# Patient Record
Sex: FEMALE | Race: OTHER | Employment: OTHER
[De-identification: names, ages, dates, MRNs, and addresses within clinical notes are randomized per-mention and may not be internally consistent; named-entity substitution may affect disease eponyms.]

---

## 2017-01-13 ENCOUNTER — OFFICE VISIT (OUTPATIENT)
Dept: OBGYN | Facility: CLINIC | Age: 62
End: 2017-01-13

## 2017-01-13 VITALS
DIASTOLIC BLOOD PRESSURE: 81 MMHG | SYSTOLIC BLOOD PRESSURE: 116 MMHG | HEIGHT: 59 IN | HEART RATE: 74 BPM | WEIGHT: 137.2 LBS | BODY MASS INDEX: 27.66 KG/M2

## 2017-01-13 DIAGNOSIS — Z71.2 ENCOUNTER TO DISCUSS TEST RESULTS: Primary | ICD-10-CM

## 2017-01-13 PROCEDURE — 99213 OFFICE O/P EST LOW 20 MIN: CPT | Performed by: OBSTETRICS & GYNECOLOGY

## 2017-01-23 ENCOUNTER — TELEPHONE (OUTPATIENT)
Dept: ONCOLOGY | Facility: CLINIC | Age: 62
End: 2017-01-23

## 2017-01-23 ENCOUNTER — OFFICE VISIT (OUTPATIENT)
Dept: ONCOLOGY | Facility: CLINIC | Age: 62
End: 2017-01-23

## 2017-01-23 VITALS
SYSTOLIC BLOOD PRESSURE: 134 MMHG | RESPIRATION RATE: 16 BRPM | TEMPERATURE: 98.1 F | WEIGHT: 138.8 LBS | BODY MASS INDEX: 28.03 KG/M2 | DIASTOLIC BLOOD PRESSURE: 84 MMHG | HEART RATE: 73 BPM

## 2017-01-23 DIAGNOSIS — M85.80 OSTEOPENIA: Primary | ICD-10-CM

## 2017-01-23 DIAGNOSIS — D05.11 DCIS (DUCTAL CARCINOMA IN SITU), RIGHT: ICD-10-CM

## 2017-01-23 PROCEDURE — 99214 OFFICE O/P EST MOD 30 MIN: CPT | Performed by: INTERNAL MEDICINE

## 2017-01-23 RX ORDER — MULTIVIT WITH MINERALS/LUTEIN
1000 TABLET ORAL DAILY
COMMUNITY

## 2017-01-23 RX ORDER — ANASTROZOLE 1 MG/1
1 TABLET ORAL DAILY
Qty: 30 TABLET | Refills: 3 | Status: SHIPPED | OUTPATIENT
Start: 2017-01-23 | End: 2017-05-15 | Stop reason: SDUPTHER

## 2017-01-24 ENCOUNTER — TELEPHONE (OUTPATIENT)
Dept: ONCOLOGY | Facility: CLINIC | Age: 62
End: 2017-01-24

## 2017-03-01 ENCOUNTER — TELEPHONE (OUTPATIENT)
Dept: ONCOLOGY | Facility: CLINIC | Age: 62
End: 2017-03-01

## 2017-03-17 ENCOUNTER — OFFICE VISIT (OUTPATIENT)
Dept: INTERNAL MEDICINE | Age: 62
End: 2017-03-17
Payer: COMMERCIAL

## 2017-03-17 VITALS
DIASTOLIC BLOOD PRESSURE: 80 MMHG | HEIGHT: 59 IN | OXYGEN SATURATION: 100 % | BODY MASS INDEX: 28.1 KG/M2 | TEMPERATURE: 99.1 F | HEART RATE: 100 BPM | SYSTOLIC BLOOD PRESSURE: 125 MMHG | WEIGHT: 139.4 LBS | RESPIRATION RATE: 12 BRPM

## 2017-03-17 DIAGNOSIS — J02.9 SORE THROAT: Primary | ICD-10-CM

## 2017-03-17 LAB — S PYO AG THROAT QL: NORMAL

## 2017-03-17 PROCEDURE — 99213 OFFICE O/P EST LOW 20 MIN: CPT | Performed by: INTERNAL MEDICINE

## 2017-03-17 PROCEDURE — 87880 STREP A ASSAY W/OPTIC: CPT | Performed by: INTERNAL MEDICINE

## 2017-03-17 RX ORDER — AMOXICILLIN 250 MG/1
250 CAPSULE ORAL 3 TIMES DAILY
Qty: 21 CAPSULE | Refills: 0 | Status: SHIPPED | OUTPATIENT
Start: 2017-03-17 | End: 2017-05-03 | Stop reason: ALTCHOICE

## 2017-03-17 ASSESSMENT — PATIENT HEALTH QUESTIONNAIRE - PHQ9
1. LITTLE INTEREST OR PLEASURE IN DOING THINGS: 0
2. FEELING DOWN, DEPRESSED OR HOPELESS: 0
SUM OF ALL RESPONSES TO PHQ9 QUESTIONS 1 & 2: 0
SUM OF ALL RESPONSES TO PHQ QUESTIONS 1-9: 0

## 2017-04-07 ENCOUNTER — HOSPITAL ENCOUNTER (OUTPATIENT)
Age: 62
Discharge: HOME | End: 2017-04-07
Payer: COMMERCIAL

## 2017-04-07 ENCOUNTER — HOSPITAL ENCOUNTER (OUTPATIENT)
Dept: GENERAL RADIOLOGY | Age: 62
Discharge: HOME | End: 2017-04-07
Payer: COMMERCIAL

## 2017-04-07 DIAGNOSIS — R09.81 NASAL CONGESTION: ICD-10-CM

## 2017-04-07 DIAGNOSIS — R09.82 POST-NASAL DRIP: ICD-10-CM

## 2017-04-07 DIAGNOSIS — R09.82 POST-NASAL DRIP: Primary | ICD-10-CM

## 2017-04-07 PROCEDURE — 70210 X-RAY EXAM OF SINUSES: CPT

## 2017-04-17 ENCOUNTER — HOSPITAL ENCOUNTER (OUTPATIENT)
Dept: MAMMOGRAPHY | Age: 62
Discharge: HOME | End: 2017-04-17
Attending: INTERNAL MEDICINE | Admitting: INTERNAL MEDICINE
Payer: COMMERCIAL

## 2017-04-17 DIAGNOSIS — M85.80 OSTEOPENIA: ICD-10-CM

## 2017-04-17 PROCEDURE — 77080 DXA BONE DENSITY AXIAL: CPT

## 2017-05-02 ENCOUNTER — HOSPITAL ENCOUNTER (OUTPATIENT)
Facility: MEDICAL CENTER | Age: 62
End: 2017-05-02
Payer: COMMERCIAL

## 2017-05-03 ENCOUNTER — TELEPHONE (OUTPATIENT)
Dept: INFUSION THERAPY | Facility: MEDICAL CENTER | Age: 62
End: 2017-05-03

## 2017-05-03 ENCOUNTER — OFFICE VISIT (OUTPATIENT)
Dept: ONCOLOGY | Age: 62
End: 2017-05-03
Payer: COMMERCIAL

## 2017-05-03 ENCOUNTER — TELEPHONE (OUTPATIENT)
Dept: ONCOLOGY | Age: 62
End: 2017-05-03

## 2017-05-03 VITALS
SYSTOLIC BLOOD PRESSURE: 131 MMHG | DIASTOLIC BLOOD PRESSURE: 83 MMHG | TEMPERATURE: 98.4 F | WEIGHT: 140.7 LBS | RESPIRATION RATE: 18 BRPM | HEART RATE: 72 BPM | BODY MASS INDEX: 28.42 KG/M2

## 2017-05-03 DIAGNOSIS — M85.80 OSTEOPENIA: ICD-10-CM

## 2017-05-03 DIAGNOSIS — D05.11 DCIS (DUCTAL CARCINOMA IN SITU), RIGHT: Primary | ICD-10-CM

## 2017-05-03 DIAGNOSIS — Z12.31 SCREENING MAMMOGRAM, ENCOUNTER FOR: ICD-10-CM

## 2017-05-03 PROCEDURE — 99214 OFFICE O/P EST MOD 30 MIN: CPT | Performed by: INTERNAL MEDICINE

## 2017-05-03 PROCEDURE — 99211 OFF/OP EST MAY X REQ PHY/QHP: CPT

## 2017-05-03 RX ORDER — ACYCLOVIR 50 MG/G
OINTMENT TOPICAL
Qty: 1 TUBE | Refills: 1 | Status: SHIPPED | OUTPATIENT
Start: 2017-05-03 | End: 2017-05-10

## 2017-05-03 RX ORDER — ALENDRONATE SODIUM 70 MG/1
70 TABLET ORAL
Qty: 4 TABLET | Refills: 3 | Status: SHIPPED | OUTPATIENT
Start: 2017-05-03 | End: 2017-07-03 | Stop reason: ALTCHOICE

## 2017-05-15 DIAGNOSIS — D05.11 DCIS (DUCTAL CARCINOMA IN SITU), RIGHT: ICD-10-CM

## 2017-05-16 DIAGNOSIS — E78.1 HYPERTRIGLYCERIDEMIA: ICD-10-CM

## 2017-05-16 RX ORDER — ANASTROZOLE 1 MG/1
TABLET ORAL
Qty: 30 TABLET | Refills: 2 | Status: SHIPPED | OUTPATIENT
Start: 2017-05-16 | End: 2017-07-03 | Stop reason: ALTCHOICE

## 2017-05-18 RX ORDER — SIMVASTATIN 40 MG
TABLET ORAL
Qty: 30 TABLET | Refills: 4 | Status: SHIPPED | OUTPATIENT
Start: 2017-05-18 | End: 2017-11-01 | Stop reason: SDUPTHER

## 2017-05-31 ENCOUNTER — HOSPITAL ENCOUNTER (OUTPATIENT)
Dept: RADIATION ONCOLOGY | Facility: MEDICAL CENTER | Age: 62
Discharge: HOME | End: 2017-05-31
Payer: COMMERCIAL

## 2017-05-31 PROCEDURE — 99212 OFFICE O/P EST SF 10 MIN: CPT | Performed by: RADIOLOGY

## 2017-07-03 ENCOUNTER — OFFICE VISIT (OUTPATIENT)
Dept: INTERNAL MEDICINE | Age: 62
End: 2017-07-03
Payer: COMMERCIAL

## 2017-07-03 VITALS
DIASTOLIC BLOOD PRESSURE: 76 MMHG | OXYGEN SATURATION: 96 % | WEIGHT: 142 LBS | HEART RATE: 74 BPM | SYSTOLIC BLOOD PRESSURE: 114 MMHG | HEIGHT: 59 IN | BODY MASS INDEX: 28.63 KG/M2

## 2017-07-03 DIAGNOSIS — R73.03 PREDIABETES: ICD-10-CM

## 2017-07-03 DIAGNOSIS — E78.00 HYPERCHOLESTEREMIA: Primary | ICD-10-CM

## 2017-07-03 LAB — HBA1C MFR BLD: 6.1 %

## 2017-07-03 PROCEDURE — 83036 HEMOGLOBIN GLYCOSYLATED A1C: CPT | Performed by: INTERNAL MEDICINE

## 2017-07-03 PROCEDURE — 99214 OFFICE O/P EST MOD 30 MIN: CPT | Performed by: INTERNAL MEDICINE

## 2017-07-03 RX ORDER — ALENDRONATE SODIUM 70 MG/1
70 TABLET ORAL
COMMUNITY
End: 2017-10-10 | Stop reason: SDUPTHER

## 2017-07-03 RX ORDER — CHLORAL HYDRATE 500 MG
3000 CAPSULE ORAL 3 TIMES DAILY
COMMUNITY
End: 2018-11-24

## 2017-08-22 RX ORDER — ALENDRONATE SODIUM 70 MG/1
TABLET ORAL
Qty: 4 TABLET | Refills: 2 | Status: SHIPPED | OUTPATIENT
Start: 2017-08-22 | End: 2017-11-26 | Stop reason: SDUPTHER

## 2017-08-25 RX ORDER — ANASTROZOLE 1 MG/1
TABLET ORAL
Qty: 30 TABLET | Refills: 1 | Status: SHIPPED | OUTPATIENT
Start: 2017-08-25 | End: 2017-10-28 | Stop reason: SDUPTHER

## 2017-09-07 ENCOUNTER — HOSPITAL ENCOUNTER (OUTPATIENT)
Dept: MAMMOGRAPHY | Age: 62
Discharge: HOME | End: 2017-09-07
Payer: COMMERCIAL

## 2017-09-07 ENCOUNTER — HOSPITAL ENCOUNTER (OUTPATIENT)
Dept: ULTRASOUND IMAGING | Age: 62
End: 2017-09-07
Payer: COMMERCIAL

## 2017-09-07 DIAGNOSIS — Z12.31 SCREENING MAMMOGRAM, ENCOUNTER FOR: ICD-10-CM

## 2017-09-07 DIAGNOSIS — D05.11 DUCTAL CARCINOMA IN SITU (DCIS) OF RIGHT BREAST: ICD-10-CM

## 2017-09-07 PROCEDURE — G0279 TOMOSYNTHESIS, MAMMO: HCPCS

## 2017-09-12 ENCOUNTER — HOSPITAL ENCOUNTER (OUTPATIENT)
Facility: MEDICAL CENTER | Age: 62
End: 2017-09-12
Payer: COMMERCIAL

## 2017-09-13 ENCOUNTER — TELEPHONE (OUTPATIENT)
Dept: INFUSION THERAPY | Facility: MEDICAL CENTER | Age: 62
End: 2017-09-13

## 2017-09-13 ENCOUNTER — OFFICE VISIT (OUTPATIENT)
Dept: ONCOLOGY | Age: 62
End: 2017-09-13
Payer: COMMERCIAL

## 2017-09-13 ENCOUNTER — TELEPHONE (OUTPATIENT)
Dept: ONCOLOGY | Age: 62
End: 2017-09-13

## 2017-09-13 VITALS
DIASTOLIC BLOOD PRESSURE: 90 MMHG | TEMPERATURE: 97.7 F | SYSTOLIC BLOOD PRESSURE: 135 MMHG | HEART RATE: 67 BPM | WEIGHT: 137.4 LBS | RESPIRATION RATE: 18 BRPM | BODY MASS INDEX: 27.75 KG/M2

## 2017-09-13 DIAGNOSIS — G62.9 NEUROPATHY: Primary | ICD-10-CM

## 2017-09-13 DIAGNOSIS — D05.11 DUCTAL CARCINOMA IN SITU (DCIS) OF RIGHT BREAST: ICD-10-CM

## 2017-09-13 PROCEDURE — 99215 OFFICE O/P EST HI 40 MIN: CPT | Performed by: INTERNAL MEDICINE

## 2017-09-13 PROCEDURE — 99212 OFFICE O/P EST SF 10 MIN: CPT

## 2017-09-20 ENCOUNTER — HOSPITAL ENCOUNTER (OUTPATIENT)
Dept: MRI IMAGING | Age: 62
Discharge: HOME | End: 2017-09-20
Payer: COMMERCIAL

## 2017-09-20 DIAGNOSIS — G62.9 NEUROPATHY: ICD-10-CM

## 2017-09-20 DIAGNOSIS — D05.11 DUCTAL CARCINOMA IN SITU (DCIS) OF RIGHT BREAST: ICD-10-CM

## 2017-09-20 LAB
BUN BLDV-MCNC: 14 MG/DL (ref 8–23)
CREAT SERPL-MCNC: 0.53 MG/DL (ref 0.5–0.9)
GFR AFRICAN AMERICAN: >60 ML/MIN
GFR NON-AFRICAN AMERICAN: >60 ML/MIN
GFR SERPL CREATININE-BSD FRML MDRD: NORMAL ML/MIN/{1.73_M2}
GFR SERPL CREATININE-BSD FRML MDRD: NORMAL ML/MIN/{1.73_M2}

## 2017-09-20 PROCEDURE — 82565 ASSAY OF CREATININE: CPT

## 2017-09-20 PROCEDURE — 6360000004 HC RX CONTRAST MEDICATION: Performed by: INTERNAL MEDICINE

## 2017-09-20 PROCEDURE — 72156 MRI NECK SPINE W/O & W/DYE: CPT

## 2017-09-20 PROCEDURE — 84520 ASSAY OF UREA NITROGEN: CPT

## 2017-09-20 PROCEDURE — 36415 COLL VENOUS BLD VENIPUNCTURE: CPT

## 2017-09-20 PROCEDURE — A9579 GAD-BASE MR CONTRAST NOS,1ML: HCPCS | Performed by: INTERNAL MEDICINE

## 2017-09-20 RX ADMIN — GADOPENTETATE DIMEGLUMINE 13 ML: 469.01 INJECTION INTRAVENOUS at 07:40

## 2017-10-09 ENCOUNTER — HOSPITAL ENCOUNTER (OUTPATIENT)
Age: 62
Discharge: HOME | End: 2017-10-09
Payer: COMMERCIAL

## 2017-10-09 ENCOUNTER — HOSPITAL ENCOUNTER (OUTPATIENT)
Dept: GENERAL RADIOLOGY | Age: 62
Discharge: HOME | End: 2017-10-09
Payer: COMMERCIAL

## 2017-10-09 ENCOUNTER — OFFICE VISIT (OUTPATIENT)
Dept: INTERNAL MEDICINE | Age: 62
End: 2017-10-09
Payer: COMMERCIAL

## 2017-10-09 VITALS
DIASTOLIC BLOOD PRESSURE: 91 MMHG | BODY MASS INDEX: 27.78 KG/M2 | WEIGHT: 137.8 LBS | HEIGHT: 59 IN | SYSTOLIC BLOOD PRESSURE: 133 MMHG | HEART RATE: 59 BPM | OXYGEN SATURATION: 99 % | RESPIRATION RATE: 12 BRPM

## 2017-10-09 DIAGNOSIS — M19.90 INFLAMMATORY ARTHRITIS: ICD-10-CM

## 2017-10-09 DIAGNOSIS — M19.90 INFLAMMATORY ARTHRITIS: Primary | ICD-10-CM

## 2017-10-09 LAB
ABSOLUTE EOS #: 0.1 K/UL (ref 0–0.4)
ABSOLUTE LYMPH #: 1.8 K/UL (ref 1–4.8)
ABSOLUTE MONO #: 0.4 K/UL (ref 0.1–1.2)
BASOPHILS # BLD: 1 %
BASOPHILS ABSOLUTE: 0 K/UL (ref 0–0.2)
DIFFERENTIAL TYPE: NORMAL
EOSINOPHILS RELATIVE PERCENT: 3 %
HCT VFR BLD CALC: 36.5 % (ref 36–46)
HEMOGLOBIN: 12.3 G/DL (ref 12–16)
LYMPHOCYTES # BLD: 34 %
MCH RBC QN AUTO: 30.6 PG (ref 26–34)
MCHC RBC AUTO-ENTMCNC: 33.8 G/DL (ref 31–37)
MCV RBC AUTO: 90.6 FL (ref 80–100)
MONOCYTES # BLD: 8 %
PDW BLD-RTO: 13.4 % (ref 12.5–15.4)
PLATELET # BLD: 228 K/UL (ref 140–450)
PLATELET ESTIMATE: NORMAL
PMV BLD AUTO: 7.8 FL (ref 6–12)
RBC # BLD: 4.03 M/UL (ref 4–5.2)
RBC # BLD: NORMAL 10*6/UL
RHEUMATOID FACTOR: <10 IU/ML
SEDIMENTATION RATE, ERYTHROCYTE: 20 MM (ref 0–20)
SEG NEUTROPHILS: 54 %
SEGMENTED NEUTROPHILS ABSOLUTE COUNT: 2.9 K/UL (ref 1.8–7.7)
WBC # BLD: 5.2 K/UL (ref 3.5–11)
WBC # BLD: NORMAL 10*3/UL

## 2017-10-09 PROCEDURE — 86038 ANTINUCLEAR ANTIBODIES: CPT

## 2017-10-09 PROCEDURE — 36415 COLL VENOUS BLD VENIPUNCTURE: CPT

## 2017-10-09 PROCEDURE — 86431 RHEUMATOID FACTOR QUANT: CPT

## 2017-10-09 PROCEDURE — 73130 X-RAY EXAM OF HAND: CPT

## 2017-10-09 PROCEDURE — 85651 RBC SED RATE NONAUTOMATED: CPT

## 2017-10-09 PROCEDURE — 85025 COMPLETE CBC W/AUTO DIFF WBC: CPT

## 2017-10-09 PROCEDURE — 99214 OFFICE O/P EST MOD 30 MIN: CPT | Performed by: INTERNAL MEDICINE

## 2017-10-09 RX ORDER — METHYLPREDNISOLONE 4 MG/1
TABLET ORAL
Qty: 1 KIT | Refills: 0 | Status: SHIPPED | OUTPATIENT
Start: 2017-10-09 | End: 2017-10-10 | Stop reason: ALTCHOICE

## 2017-10-09 ASSESSMENT — ENCOUNTER SYMPTOMS
ALLERGIC/IMMUNOLOGIC NEGATIVE: 1
EYES NEGATIVE: 1

## 2017-10-09 NOTE — PROGRESS NOTES
DAILY 30 tablet 1    alendronate (FOSAMAX) 70 MG tablet TAKE 1 TABLET BY MOUTH ONCE WEEKLY BEFORE BREAKFAST, ON AN EMPTY STOMACH: REMAIN UPRIGHT FOR 30 MINUTES:TAKE WITH 8 OUNCES OF WATER 4 tablet 2    Omega-3 Fatty Acids (FISH OIL) 1000 MG CAPS Take 3,000 mg by mouth 3 times daily      alendronate (FOSAMAX) 70 MG tablet Take 70 mg by mouth every 7 days      simvastatin (ZOCOR) 40 MG tablet TAKE ONE TABLET BY MOUTH EVERY NIGHT AT BEDTIME 30 tablet 4    Biotin 5000 MCG CAPS Take by mouth      fluticasone (FLONASE) 50 MCG/ACT nasal spray 2 sprays to each nostril once daily. 1 Bottle 2    Ascorbic Acid (VITAMIN C) 1000 MG tablet Take 1,000 mg by mouth daily      CO ENZYME Q-10 PO Take 1 tablet by mouth daily      aspirin 81 MG EC tablet Take 1 tablet by mouth daily LAST DOSE 7/20/16 30 tablet 0    Probiotic Product (PROBIOTIC DAILY PO) Take 1 tablet by mouth      omeprazole (PRILOSEC OTC) 20 MG tablet Take 1 tablet by mouth daily 30 tablet 3    cetirizine (ZYRTEC ALLERGY) 10 MG tablet Take 10 mg by mouth daily as needed        No current facility-administered medications for this visit. Allergies   Allergen Reactions    Sulfamethoxazole-Trimethoprim Rash       Health Maintenance   Topic Date Due    Zostavax vaccine  07/05/2017    Flu vaccine (1) 09/01/2017    Breast cancer screen  09/07/2018    Cervical cancer screen  11/15/2019    Diabetes screen  07/03/2020    Lipid screen  06/08/2021    Colon cancer screen colonoscopy  10/13/2024    DTaP/Tdap/Td vaccine (2 - Td) 09/24/2025    Hepatitis C screen  Addressed    HIV screen  Addressed       Controlled Substances Monitoring:      HPI:     Hand Pain    The incident occurred more than 1 week ago (over 2 months ago). There was no injury mechanism. The pain is present in the left fingers and right fingers. The quality of the pain is described as aching. The pain does not radiate. The pain is moderate. The pain has been constant since the incident. The symptoms are aggravated by movement. She has tried rest (has gotten massages from ) for the symptoms. The treatment provided no relief. ROS:     Review of Systems   Constitutional: Negative. Eyes: Negative. Endocrine: Negative. Allergic/Immunologic: Negative. All other systems reviewed and are negative. Objective:     Physical Exam   Constitutional: She is oriented to person, place, and time. She appears well-developed. HENT:   Head: Normocephalic and atraumatic. Neck: Neck supple. Cardiovascular: Normal rate. Pulmonary/Chest: Effort normal and breath sounds normal.   Abdominal: Soft. Musculoskeletal: She exhibits no edema. Right hand: She exhibits decreased range of motion, tenderness and bony tenderness. Left hand: She exhibits decreased range of motion, tenderness and bony tenderness. Neurological: She is alert and oriented to person, place, and time. Skin: Skin is warm and dry. Psychiatric: She has a normal mood and affect. Her behavior is normal.   Vitals reviewed. BP (!) 133/91   Pulse 59   Resp 12   Ht 4' 11\" (1.499 m)   Wt 137 lb 12.8 oz (62.5 kg)   LMP 07/18/2009 (Exact Date)   SpO2 99%   Breastfeeding? No   BMI 27.83 kg/m²     Assessment:      1. Inflammatory arthritis  Sedimentation rate, automated    CBC Auto Differential    GISEL Screen with Reflex    Rheumatoid Factor    XR HAND LEFT (MIN 3 VIEWS)    XR HAND RIGHT (MIN 3 VIEWS)       Plan:       1.  Susan Finch received counseling on the following healthy behaviors: medication adherence    2. Prior labs and health maintenance reviewed. 3.  Discussed use, benefit, and side effects of prescribed medications. Barriers to medication compliance addressed. All her questions were answered. Pt voiced understanding. Susan Finch will continue current medications, diet and exercise.               Orders Placed This Encounter   Medications    methylPREDNISolone (MEDROL DOSEPACK) 4 MG tablet Sig: Take by mouth. Dispense:  1 kit     Refill:  0          Completed Refills               Requested Prescriptions     Signed Prescriptions Disp Refills    methylPREDNISolone (MEDROL DOSEPACK) 4 MG tablet 1 kit 0     Sig: Take by mouth. 4. Patient given educational materials - see patient instructions    5. Was a self-tracking handout given in paper form or via [employer]? No  If yes, see orders or list here. Orders Placed This Encounter   Procedures    XR HAND LEFT (MIN 3 VIEWS)    XR HAND RIGHT (MIN 3 VIEWS)    Sedimentation rate, automated     Standing Status:   Future     Standing Expiration Date:   10/9/2018    CBC Auto Differential     Standing Status:   Future     Standing Expiration Date:   10/9/2018    GISEL Screen with Reflex     Standing Status:   Future     Standing Expiration Date:   10/9/2018    Rheumatoid Factor     Standing Status:   Future     Standing Expiration Date:   10/10/2018       No Follow-up on file. Patient agreed with treatment plan.     Electronically signed by Michael Manriquez MD on 10/9/2017 at 10:36 AM

## 2017-10-10 ENCOUNTER — OFFICE VISIT (OUTPATIENT)
Dept: NEUROLOGY | Age: 62
End: 2017-10-10
Payer: COMMERCIAL

## 2017-10-10 VITALS
DIASTOLIC BLOOD PRESSURE: 82 MMHG | BODY MASS INDEX: 27.9 KG/M2 | WEIGHT: 138.4 LBS | HEIGHT: 59 IN | SYSTOLIC BLOOD PRESSURE: 136 MMHG | HEART RATE: 65 BPM

## 2017-10-10 DIAGNOSIS — G56.03 BILATERAL CARPAL TUNNEL SYNDROME: Primary | ICD-10-CM

## 2017-10-10 LAB — ANTI-NUCLEAR ANTIBODY (ANA): NEGATIVE

## 2017-10-10 PROCEDURE — 99204 OFFICE O/P NEW MOD 45 MIN: CPT | Performed by: PSYCHIATRY & NEUROLOGY

## 2017-10-10 RX ORDER — CLOBETASOL PROPIONATE 0.46 MG/ML
SOLUTION TOPICAL 2 TIMES DAILY PRN
COMMUNITY
End: 2018-11-24 | Stop reason: ALTCHOICE

## 2017-10-10 RX ORDER — KETOCONAZOLE 20 MG/ML
SHAMPOO TOPICAL DAILY PRN
COMMUNITY
End: 2018-11-24 | Stop reason: ALTCHOICE

## 2017-10-10 RX ORDER — LEVOCETIRIZINE DIHYDROCHLORIDE 5 MG/1
5 TABLET, FILM COATED ORAL DAILY PRN
COMMUNITY
End: 2017-12-18 | Stop reason: ALTCHOICE

## 2017-10-10 RX ORDER — PHENOL 1.4 %
600 AEROSOL, SPRAY (ML) MUCOUS MEMBRANE DAILY
COMMUNITY

## 2017-10-10 NOTE — LETTER
[facility] Neurology Specialist  [facility] [place] [address]  Phone: [phone]  Fax: [phone]    Imelda Klein MD        October 10, 2017       Patient: Raffi Sim   MR Number: [id number]   YOB: 1955   Date of Visit: 10/10/2017       Dear Dr. Jayashree Jones: Thank you for the request for consultation for [facility] . Below are the relevant portions of my assessment and plan of care. Subjective:      Patient ID: Raffi Sim is a 63 y.o. female. HPI  The condition is she has been having bilateral hand pain for the past 2 months affecting her palm and outer three fingers . Her hands are swollen with trouble closing hands with decreased  strength with numbness of outer three fingers . Pain will feel like burning pain which is there all the time not going above wrist . There is mild neck ache which is intermittent more with activity . She reports to have had carpal tunnel with right side surgery in 2007 having decreased dexterity in hand dropping things with that hand at that time with no hand pain. She felt she hand dandruff seeing dermatologist with concern she had psoriasis. There is no joint pain. She has not taken medications with concern that NSAID may upset her stomach with gastritis  . Testing MRI of cervical spine mild circumferential disc bulging and posterior ridging producing mild spinal canal stenosis at C4-C5, C5-C6 accompanied with uncovertebral spurring producing marked left neural foraminal stenosis at C4-C5 and moderate left and mild right neural foraminal stenosis at C5-C6. Maria E Pretty Acute left C3-C4 synovitis/facet arthritis suspect producing moderate left neural foraminal stenosis, September 2017     Past Medical History:   Diagnosis Date    Allergic rhinitis     Breast cancer, right ([facility] [address] 07/05/2016    Ductal carcinoma in situ (DCIS) of right breast     Hyperlipidemia     currently off meds, per pt's choice    Psoriasis  Thickened endometrium 2016    while on Tamoxifen    Wears glasses        Past Surgical History:   Procedure Laterality Date    APPENDECTOMY  1994    BREAST LUMPECTOMY Right 2016    BREAST LUMPECTOMY Right 2016    exe rt breast    BREAST LUMPECTOMY Right     CARPAL TUNNEL RELEASE Right      SECTION  , ,     COLONOSCOPY      ENDOMETRIAL BIOPSY  2016    FINGER TRIGGER RELEASE Right 2003    FRACTURE SURGERY Right 2001    THUMB    UPPER GASTROINTESTINAL ENDOSCOPY         Family History   Problem Relation Age of Onset    Diabetes Mother     Breast Cancer Mother     High Blood Pressure Mother     Stroke Father     Cancer Father      Pancreas    Irritable Bowel Syndrome Father     Liver Cancer Brother     Liver Cancer Brother        Social History     Social History    Marital status:      Spouse name: Tristin Reasons Number of children: 2    Years of education: 12     Occupational History    RN [employer]     Social History Main Topics    Smoking status: Never Smoker    Smokeless tobacco: Never Used    Alcohol use No    Drug use: No    Sexual activity: Yes     Partners: Male     Other Topics Concern    None     Social History Narrative    None       Current Outpatient Prescriptions   Medication Sig Dispense Refill    levocetirizine (XYZAL) 5 MG tablet Take 5 mg by mouth daily as needed      ketoconazole (NIZORAL) 2 % shampoo Apply topically daily as needed for Itching Apply topically daily as needed.  clobetasol (TEMOVATE) 0.05 % external solution Apply topically 2 times daily Apply topically 2 times daily.       CALCIUM CARBONATE PO Take by mouth daily      anastrozole (ARIMIDEX) 1 MG tablet TAKE ONE TABLET BY MOUTH DAILY 30 tablet 1    alendronate (FOSAMAX) 70 MG tablet TAKE 1 TABLET BY MOUTH ONCE WEEKLY BEFORE BREAKFAST, ON AN EMPTY STOMACH: REMAIN UPRIGHT FOR 30 MINUTES:TAKE WITH 8 OUNCES OF WATER 4 tablet 2  simvastatin (ZOCOR) 40 MG tablet TAKE ONE TABLET BY MOUTH EVERY NIGHT AT BEDTIME 30 tablet 4    aspirin 81 MG EC tablet Take 1 tablet by mouth daily LAST DOSE 7/20/16 30 tablet 0    Probiotic Product (PROBIOTIC DAILY PO) Take 1 tablet by mouth      omeprazole (PRILOSEC OTC) 20 MG tablet Take 1 tablet by mouth daily 30 tablet 3    cetirizine (ZYRTEC ALLERGY) 10 MG tablet Take 10 mg by mouth daily as needed       Omega-3 Fatty Acids (FISH OIL) 1000 MG CAPS Take 3,000 mg by mouth 3 times daily      Biotin 5000 MCG CAPS Take by mouth      fluticasone (FLONASE) 50 MCG/ACT nasal spray 2 sprays to each nostril once daily. 1 Bottle 2    Ascorbic Acid (VITAMIN C) 1000 MG tablet Take 1,000 mg by mouth daily      CO ENZYME Q-10 PO Take 1 tablet by mouth daily       No current facility-administered medications for this visit. Allergies   Allergen Reactions    Sulfamethoxazole-Trimethoprim Rash       Review of Systems   All other systems reviewed and are negative. Objective:   Physical Exam  Vitals:    10/10/17 0849   BP: 136/82   Pulse: 65     weight: 138 lb 6.4 oz (62.8 kg)    Neurological Examination  Bilateral Tinels' at wrists  Constitutional .General exam well groomed   Ears /Nose/Throat: external ear . Normal exam  Neck and thyroid . Normal size  Respiratory . Breathsounds clear bilaterally  Cardiovascular: Auscultation of heart with regular rate and rhythm and normal heart sounds  Musculoskeletal. Muscle bulk and tone normal                                                           Muscle strength 5/5 strength throughout                                                                                 No dysmetria or dysdiadokinesis  No tremor   Normal fine motor  Orientation Alert and oriented x 3   Language process and speech normal . No aphasia   Cranial nerve 2 normal acuety and visual fields  Cranial nerve 3, 4 and 6 . Extraocular muscles are intact .  Pupils are equal and reactive

## 2017-10-10 NOTE — COMMUNICATION BODY
Right 2001    THUMB    UPPER GASTROINTESTINAL ENDOSCOPY         Family History   Problem Relation Age of Onset    Diabetes Mother     Breast Cancer Mother     High Blood Pressure Mother     Stroke Father     Cancer Father      Pancreas    Irritable Bowel Syndrome Father     Liver Cancer Brother     Liver Cancer Brother        Social History     Social History    Marital status:      Spouse name: Celia Ceo Number of children: 2    Years of education: 12     Occupational History    RN [employer]     Social History Main Topics    Smoking status: Never Smoker    Smokeless tobacco: Never Used    Alcohol use No    Drug use: No    Sexual activity: Yes     Partners: Male     Other Topics Concern    None     Social History Narrative    None       Current Outpatient Prescriptions   Medication Sig Dispense Refill    levocetirizine (XYZAL) 5 MG tablet Take 5 mg by mouth daily as needed      ketoconazole (NIZORAL) 2 % shampoo Apply topically daily as needed for Itching Apply topically daily as needed.  clobetasol (TEMOVATE) 0.05 % external solution Apply topically 2 times daily Apply topically 2 times daily.       CALCIUM CARBONATE PO Take by mouth daily      anastrozole (ARIMIDEX) 1 MG tablet TAKE ONE TABLET BY MOUTH DAILY 30 tablet 1    alendronate (FOSAMAX) 70 MG tablet TAKE 1 TABLET BY MOUTH ONCE WEEKLY BEFORE BREAKFAST, ON AN EMPTY STOMACH: REMAIN UPRIGHT FOR 30 MINUTES:TAKE WITH 8 OUNCES OF WATER 4 tablet 2    simvastatin (ZOCOR) 40 MG tablet TAKE ONE TABLET BY MOUTH EVERY NIGHT AT BEDTIME 30 tablet 4    aspirin 81 MG EC tablet Take 1 tablet by mouth daily LAST DOSE 7/20/16 30 tablet 0    Probiotic Product (PROBIOTIC DAILY PO) Take 1 tablet by mouth      omeprazole (PRILOSEC OTC) 20 MG tablet Take 1 tablet by mouth daily 30 tablet 3    cetirizine (ZYRTEC ALLERGY) 10 MG tablet Take 10 mg by mouth daily as needed       Omega-3 Fatty Acids (FISH OIL) 1000 MG CAPS Take 3,000 mg by mouth 3 times daily      Biotin 5000 MCG CAPS Take by mouth      fluticasone (FLONASE) 50 MCG/ACT nasal spray 2 sprays to each nostril once daily. 1 Bottle 2    Ascorbic Acid (VITAMIN C) 1000 MG tablet Take 1,000 mg by mouth daily      CO ENZYME Q-10 PO Take 1 tablet by mouth daily       No current facility-administered medications for this visit. Allergies   Allergen Reactions    Sulfamethoxazole-Trimethoprim Rash       Review of Systems   All other systems reviewed and are negative. Objective:   Physical Exam  Vitals:    10/10/17 0849   BP: 136/82   Pulse: 65     weight: 138 lb 6.4 oz (62.8 kg)    Neurological Examination  Bilateral Tinels' at wrists  Constitutional .General exam well groomed   Ears /Nose/Throat: external ear . Normal exam  Neck and thyroid . Normal size  Respiratory . Breathsounds clear bilaterally  Cardiovascular: Auscultation of heart with regular rate and rhythm and normal heart sounds  Musculoskeletal. Muscle bulk and tone normal                                                           Muscle strength 5/5 strength throughout                                                                                 No dysmetria or dysdiadokinesis  No tremor   Normal fine motor  Orientation Alert and oriented x 3   Language process and speech normal . No aphasia   Cranial nerve 2 normal acuety and visual fields  Cranial nerve 3, 4 and 6 . Extraocular muscles are intact . Pupils are equal and reactive   Cranial nerve 5 . Normal strength of masseter and temporalis . Intact corneal reflex. Normal facial sensation  Cranial nerve 7 normal exam   Cranial nerve 8. Grossly intact hearing   Cranial nerve 9 and 10. Symmetric palate elevation   Cranial nerve 11 , 5 out of 5 strength   Cranial Nerve 12 midline tongue . No atrophy  Sensation . Decreased pinprick and light touch bilateral hypothenar and 5th digit  Deep Tendon Reflexes normal  Plantar response flexor bilaterally    Assessment:      1.

## 2017-10-10 NOTE — PROGRESS NOTES
Partners: Male     Other Topics Concern    None     Social History Narrative    None       Current Outpatient Prescriptions   Medication Sig Dispense Refill    levocetirizine (XYZAL) 5 MG tablet Take 5 mg by mouth daily as needed      ketoconazole (NIZORAL) 2 % shampoo Apply topically daily as needed for Itching Apply topically daily as needed.  clobetasol (TEMOVATE) 0.05 % external solution Apply topically 2 times daily Apply topically 2 times daily.  CALCIUM CARBONATE PO Take by mouth daily      anastrozole (ARIMIDEX) 1 MG tablet TAKE ONE TABLET BY MOUTH DAILY 30 tablet 1    alendronate (FOSAMAX) 70 MG tablet TAKE 1 TABLET BY MOUTH ONCE WEEKLY BEFORE BREAKFAST, ON AN EMPTY STOMACH: REMAIN UPRIGHT FOR 30 MINUTES:TAKE WITH 8 OUNCES OF WATER 4 tablet 2    simvastatin (ZOCOR) 40 MG tablet TAKE ONE TABLET BY MOUTH EVERY NIGHT AT BEDTIME 30 tablet 4    aspirin 81 MG EC tablet Take 1 tablet by mouth daily LAST DOSE 7/20/16 30 tablet 0    Probiotic Product (PROBIOTIC DAILY PO) Take 1 tablet by mouth      omeprazole (PRILOSEC OTC) 20 MG tablet Take 1 tablet by mouth daily 30 tablet 3    cetirizine (ZYRTEC ALLERGY) 10 MG tablet Take 10 mg by mouth daily as needed       Omega-3 Fatty Acids (FISH OIL) 1000 MG CAPS Take 3,000 mg by mouth 3 times daily      Biotin 5000 MCG CAPS Take by mouth      fluticasone (FLONASE) 50 MCG/ACT nasal spray 2 sprays to each nostril once daily. 1 Bottle 2    Ascorbic Acid (VITAMIN C) 1000 MG tablet Take 1,000 mg by mouth daily      CO ENZYME Q-10 PO Take 1 tablet by mouth daily       No current facility-administered medications for this visit. Allergies   Allergen Reactions    Sulfamethoxazole-Trimethoprim Rash       Review of Systems   All other systems reviewed and are negative. Objective:   Physical Exam  Active Problem ***. The condition is ***. Significant medications***.  Testing ***     Past Medical History:   Diagnosis Date    Allergic rhinitis tablet TAKE 1 TABLET BY MOUTH ONCE WEEKLY BEFORE BREAKFAST, ON AN EMPTY STOMACH: REMAIN UPRIGHT FOR 30 MINUTES:TAKE WITH 8 OUNCES OF WATER 4 tablet 2    simvastatin (ZOCOR) 40 MG tablet TAKE ONE TABLET BY MOUTH EVERY NIGHT AT BEDTIME 30 tablet 4    aspirin 81 MG EC tablet Take 1 tablet by mouth daily LAST DOSE 7/20/16 30 tablet 0    Probiotic Product (PROBIOTIC DAILY PO) Take 1 tablet by mouth      omeprazole (PRILOSEC OTC) 20 MG tablet Take 1 tablet by mouth daily 30 tablet 3    cetirizine (ZYRTEC ALLERGY) 10 MG tablet Take 10 mg by mouth daily as needed       Omega-3 Fatty Acids (FISH OIL) 1000 MG CAPS Take 3,000 mg by mouth 3 times daily      Biotin 5000 MCG CAPS Take by mouth      fluticasone (FLONASE) 50 MCG/ACT nasal spray 2 sprays to each nostril once daily. 1 Bottle 2    Ascorbic Acid (VITAMIN C) 1000 MG tablet Take 1,000 mg by mouth daily      CO ENZYME Q-10 PO Take 1 tablet by mouth daily       No current facility-administered medications for this visit. Allergies   Allergen Reactions    Sulfamethoxazole-Trimethoprim Rash       Vitals:    10/10/17 0849   BP: 136/82   Pulse: 65     Admission weight: 138 lb 6.4 oz (62.8 kg)    Neurological Examination  Constitutional .General exam well groomed   Ears /Nose/Throat: external ear . Normal exam  Neck and thyroid . Normal size  Respiratory . Breathsounds clear bilaterally  Cardiovascular: Auscultation of heart with regular rate and rhythm and normal heart sounds  Musculoskeletal. Muscle bulk and tone normal                                                           Muscle strength 5/5 strength throughout                                                                                 No dysmetria or dysdiadokinesis  No tremor   Normal fine motor  Gait normal   Orientation Alert and oriented x 3   Language process and speech normal . No aphasia   Cranial nerve 2 normal acuety and visual fields  Cranial nerve 3, 4 and 6 . Extraocular muscles are intact . Pupils are equal and reactive   Cranial nerve 5 . Normal strength of masseter and temporalis . Intact corneal reflex. Normal facial sensation  Cranial nerve 7 normal exam   Cranial nerve 8. Grossly intact hearing   Cranial nerve 9 and 10. Symmetric palate elevation   Cranial nerve 11 , 5 out of 5 strength   Cranial Nerve 12 midline tongue . No atrophy  Sensation . Normal proprioception . Intact Vibration .  Normal pinprick and light touch   Deep Tendon Reflexes normal  Plantar response flexor bilaterally    Assessment :    ***    Plan:    ***      Assessment:      ***      Plan:      ***

## 2017-10-10 NOTE — PROGRESS NOTES
Right 2001    THUMB    UPPER GASTROINTESTINAL ENDOSCOPY         Family History   Problem Relation Age of Onset    Diabetes Mother     Breast Cancer Mother     High Blood Pressure Mother     Stroke Father     Cancer Father      Pancreas    Irritable Bowel Syndrome Father     Liver Cancer Brother     Liver Cancer Brother        Social History     Social History    Marital status:      Spouse name: Philipp Ballesteros Number of children: 2    Years of education: 12     Occupational History    RN [employer]     Social History Main Topics    Smoking status: Never Smoker    Smokeless tobacco: Never Used    Alcohol use No    Drug use: No    Sexual activity: Yes     Partners: Male     Other Topics Concern    None     Social History Narrative    None       Current Outpatient Prescriptions   Medication Sig Dispense Refill    levocetirizine (XYZAL) 5 MG tablet Take 5 mg by mouth daily as needed      ketoconazole (NIZORAL) 2 % shampoo Apply topically daily as needed for Itching Apply topically daily as needed.  clobetasol (TEMOVATE) 0.05 % external solution Apply topically 2 times daily Apply topically 2 times daily.       CALCIUM CARBONATE PO Take by mouth daily      anastrozole (ARIMIDEX) 1 MG tablet TAKE ONE TABLET BY MOUTH DAILY 30 tablet 1    alendronate (FOSAMAX) 70 MG tablet TAKE 1 TABLET BY MOUTH ONCE WEEKLY BEFORE BREAKFAST, ON AN EMPTY STOMACH: REMAIN UPRIGHT FOR 30 MINUTES:TAKE WITH 8 OUNCES OF WATER 4 tablet 2    simvastatin (ZOCOR) 40 MG tablet TAKE ONE TABLET BY MOUTH EVERY NIGHT AT BEDTIME 30 tablet 4    aspirin 81 MG EC tablet Take 1 tablet by mouth daily LAST DOSE 7/20/16 30 tablet 0    Probiotic Product (PROBIOTIC DAILY PO) Take 1 tablet by mouth      omeprazole (PRILOSEC OTC) 20 MG tablet Take 1 tablet by mouth daily 30 tablet 3    cetirizine (ZYRTEC ALLERGY) 10 MG tablet Take 10 mg by mouth daily as needed       Omega-3 Fatty Acids (FISH OIL) 1000 MG CAPS Take 3,000 mg by mouth 3 times daily      Biotin 5000 MCG CAPS Take by mouth      fluticasone (FLONASE) 50 MCG/ACT nasal spray 2 sprays to each nostril once daily. 1 Bottle 2    Ascorbic Acid (VITAMIN C) 1000 MG tablet Take 1,000 mg by mouth daily      CO ENZYME Q-10 PO Take 1 tablet by mouth daily       No current facility-administered medications for this visit. Allergies   Allergen Reactions    Sulfamethoxazole-Trimethoprim Rash       Review of Systems   All other systems reviewed and are negative. Objective:   Physical Exam  Vitals:    10/10/17 0849   BP: 136/82   Pulse: 65     weight: 138 lb 6.4 oz (62.8 kg)    Neurological Examination  Bilateral Tinels' at wrists  Constitutional .General exam well groomed   Ears /Nose/Throat: external ear . Normal exam  Neck and thyroid . Normal size  Respiratory . Breathsounds clear bilaterally  Cardiovascular: Auscultation of heart with regular rate and rhythm and normal heart sounds  Musculoskeletal. Muscle bulk and tone normal                                                           Muscle strength 5/5 strength throughout                                                                                 No dysmetria or dysdiadokinesis  No tremor   Normal fine motor  Orientation Alert and oriented x 3   Language process and speech normal . No aphasia   Cranial nerve 2 normal acuety and visual fields  Cranial nerve 3, 4 and 6 . Extraocular muscles are intact . Pupils are equal and reactive   Cranial nerve 5 . Normal strength of masseter and temporalis . Intact corneal reflex. Normal facial sensation  Cranial nerve 7 normal exam   Cranial nerve 8. Grossly intact hearing   Cranial nerve 9 and 10. Symmetric palate elevation   Cranial nerve 11 , 5 out of 5 strength   Cranial Nerve 12 midline tongue . No atrophy  Sensation . Decreased pinprick and light touch bilateral hypothenar and 5th digit  Deep Tendon Reflexes normal  Plantar response flexor bilaterally    Assessment:      1.

## 2017-10-12 ENCOUNTER — HOSPITAL ENCOUNTER (OUTPATIENT)
Age: 62
Discharge: HOME | End: 2017-10-12
Payer: COMMERCIAL

## 2017-10-12 DIAGNOSIS — E78.00 HYPERCHOLESTEREMIA: ICD-10-CM

## 2017-10-12 LAB
CHOLESTEROL/HDL RATIO: 4.5
CHOLESTEROL: 175 MG/DL
HDLC SERPL-MCNC: 39 MG/DL
LDL CHOLESTEROL: 104 MG/DL (ref 0–130)
TRIGL SERPL-MCNC: 160 MG/DL
VLDLC SERPL CALC-MCNC: ABNORMAL MG/DL (ref 1–30)

## 2017-10-12 PROCEDURE — 36415 COLL VENOUS BLD VENIPUNCTURE: CPT

## 2017-10-12 PROCEDURE — 80061 LIPID PANEL: CPT

## 2017-10-17 ENCOUNTER — HOSPITAL ENCOUNTER (OUTPATIENT)
Dept: OCCUPATIONAL THERAPY | Age: 62
Setting detail: THERAPIES SERIES
Discharge: HOME | End: 2017-10-17
Payer: COMMERCIAL

## 2017-10-17 PROCEDURE — 97140 MANUAL THERAPY 1/> REGIONS: CPT

## 2017-10-17 PROCEDURE — 97110 THERAPEUTIC EXERCISES: CPT

## 2017-10-17 PROCEDURE — 97165 OT EVAL LOW COMPLEX 30 MIN: CPT

## 2017-10-17 NOTE — CONSULTS
[x] [address] [facility] floor       [address], [place]         Phone: [phone]       Fax: [phone] [] [address] at [facility] [address] [place] [address] , [address] [place] [address]  Phone: [phone]  Fax: [phone]       Occupational Therapy Hand & Upper Extremity  Initial Evaluation      Date: 10/17/2017      Patient: Yisel Rainey  : 1955  MRN: [id number]    Physician: Kirk Sheets MD    Insurance: [employer]     Medical Diagnosis: Psoriatic arthritis  L40.50, Bilateral osteoarthritis M19.041/M19.042. Rehab Codes: Edema, localized R60.1; Pain in right/left hands M79.641/M79.645; pain in right/left fingers M79.644/M79.645;Muscle wasting/atrophy bilat [facility] sites M62.591/M62.592. Onset Date: 2017    Next Dr. Knight Finer: 12/15/17    Past Medical History: [  ] Unremarkable  [  ] MI/Heart Problems  [  ] Refer to full medical chart in EPIC  [  ] Diabetes  [ X ] Manuelita Davon breast  [  ] HTN  [ X ] Arthritis  [  ] Pacemaker  [ X ] Other: Right carpal tunnel release  Medications:  [  Windy Eaton Refer to full medical chart in [facility]  [  ] None  [  ] Other:  Allergies:  [  ] Refer to full medical chart in EPIC  [  ] None  [ X ] Other: Bactrim (sulfa)       Mechanism of Injury: NA  Surgery Date: NA    Precautions:   []None [] Fall Risk []WB Status [] Pacemaker [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 Critical Job/Daily Task Description: Self care, household tasks, works as a part time PAT nurse. Work Status: [x] Normal [] Restricted [] Off D/T Injury/Condition [] Retired [] Unemployed [] Disabled []Other:  Orthosis:  NA   [] Currently has [] To be custom fabricated this date []Planned for subsequent visit  Type:      Subjective:  Chief Complaint: Unable to fully close hands, making work, cooking tasks difficult.   Pain: Intensity:   5/10 Location: bilateral hands  Pain Type: [x] Constant [] Intermittent   [  ] with pain meds at rest   [] With movement/Resistive activity [] With Sedentary activity    Pain Altered Tx: []Yes [x]No  Action Taken: NA    Objective:  Tests/Measurements:  Current Functional Level:  75% functionally impaired as measured with the DASH Functional Survey. 0-100 scale, with 0 = no Deficits    DIGITS:  Bilateral thumbs are opposing to base of little fingers (WNL). Bilateral finger extension is either WNL or WFL and pt states she is not concerned with extension status. All marked below as [facility]. Initial 10/17/17       Extension/Flexion  Right     AROM INDEX MIDDLE RING LITTLE   MCP WFL/70 WFL/68 WFL/58 WFL/40   PIP WFL/50 WFL/58 WFL/65 WFL/50   DIP WFL/34 WFL/48 WFL/52 WFL/62 (WNL)          Initially no finger pads touching palm, following gentle PROM all finger pads touching palm. Middle and ring fingers are triggering with active composite flexion. DIGITS:  Initial 10/17/17       Extension/Flexion  Left       AROM INDEX MIDDLE RING LITTLE   MCP WFL/60 WFL/65 WFL/55 WFL/47   PIP WFL/75 WFL/85 WFL/92 WFL/75   DIP WFL52 WFL/64 WFL/50 WFL/65          Initially no finger pads touching palm, following gentle PROM all finger pads touching palm. Middle finger triggered once with active composite flexion. STRENGTH:    Initial 10/17/17    RIGHT LEFT    21.3 27.3   Lateral pinch 13 12   2 point pinch   9   8  with pain   3 jaw pinch 10   8  with pain     Edema: Generalized bilateral hand and finger edema observed, indicative of psoriatic arthritis. Sensation:  No complaints. Problems:     [x] Pain       [x] ROM      [x] Strength  [x] Function     [] Adherent Scar    [x] Edema     [] Open Wound    [] Coordination    [] Sensation     [] Falls: History/Risk of   []          Short Term Goals: ( 10   Treatments)  1. Decrease Pain: Will have 0/10 pain at rest. Will have 2/10 pain with mildly resistive activity.   2. Increase AROM (degrees): Will be able to consistently make a loose fist with both hands, touching all fingers to palm. 3. Increase strength (pounds): will have 30 pounds of  strength bilaterally. 4. Increase function:  DASH score will be 60% functionally impaired or less  5. Decrease Edema: Pt will report a feeling of less fullness in bilateral hands. 6. Independent with Home Exercise Program in 2 sessions. 7.   Edema: Pt will voice positioning strategies for edema positioning of hands and demonstrated edema massage techniques. Long Term Goals: (  20  Treatments)  1. Pain:  Will have 2/10 pain with moderately resistive activity. 2. AROM: Will be able to make full active fists with both hands consistently. 3. Function:  DASH score will be 45% functionally impaired or less. Patient Goals: Start basic exercise of fingers/hands, be able to open jars, cut food, cook, have more strength, write more legibly. Treatment Potential: [x]Good []Fair []Poor  Suggested Professional Referral: []Yes [x]No  Domestic Concerns: []Yes [x]No   Barriers to Goal Achievement: []Yes [x] No    Comments/Assessment: Pt is familiar to department, having been seen for unrelated right carpal tunnel release in 2007. She is now referred for bilateral psoriatic arthritis and osteoarthritis of the hands. Pt states she is having difficulty making fists, although she has satisfactory finger extension, and functional bilateral thumb motion. Both hands and fingers have diffuse edema. Pt reports constant pain of 5/10, usually equal, right sometimes worse. She reports having difficulty with work tasks including taking blood pressure readings, and home tasks especially cooking and using a knife. Following gentle PROM pt able to make bilateral functional fists (not tight fists).  There was mild stenosing of the right middle and ring fingers, and left middle finger observed with fisting, see above    Home Program Initiated: A/PROM, foam block gripping. [ X ] Written    [ X ] Verbal    [ X ] Demo   Comprehension of Education: [x] Yes [] No [] Needs Review  [x]Plans /[x] Goals, [x]Risks/ [x] Benefits discussed with [x] Patient []Family    Treatment Plan:  Frequency/Duration:     3  Times a week, for  36 visits. [x] Therapeutic Exercise 61659 [] Electrical Stim [id number]    []Neuro Re-Ed  94466  [x] Written Home Program    [] Iontophoresis 41400  [x] Therapeutic Activities 30230  [x] Manual Therapy 61803  [] Manual Therapy 20151  [] Ultrasound 71093   [x] Hot Pack/Cold Pack 98319  [] Attended Electrical Stim [id number] [] Massage 80479    [] Ortho Fit/Train [id number]  [] Ortho Re-Fit/Check  [id number]          Treatment This Date:  [x] Eval -Low Complexity    24   Min. [x] Therapeutic Exercise     17    Min. [x] Manual Therapy        14   Min. Flow Sheet:  Exercise Reps/Time Weight/Level Comments   Gentle PROM - Bilateral fiingers   Administered. Pt educ provided: verbal, demo. Bilateral- AROM - bilateral fingers 10 reps  Completed. Pt educ provided: Written, verbal, demo. Bilateral -Foam block -  10 reps Yellow Pt educ provided: Written, verbal, demo. New yellow foam block issued for HEP. Bilateral - Grasp and release with texture bins  beans Planned for subsequent session. Bilateral - marbles - radial release   Planned for subsequent session. Pt education - edema positioning and edema massage techniques   Planned for subsequent session. Total Treatment Time:  54  Min.      Time In/Time Out: 7694 - 5361       Electronically signed by RYAN Serra/L, CHT on 10/17/2017 at 8:31 AM

## 2017-10-19 ENCOUNTER — HOSPITAL ENCOUNTER (OUTPATIENT)
Dept: OCCUPATIONAL THERAPY | Age: 62
Setting detail: THERAPIES SERIES
Discharge: HOME | End: 2017-10-19
Payer: COMMERCIAL

## 2017-10-19 PROCEDURE — 97140 MANUAL THERAPY 1/> REGIONS: CPT

## 2017-10-19 PROCEDURE — 97110 THERAPEUTIC EXERCISES: CPT

## 2017-10-19 NOTE — FLOWSHEET NOTE
[] [address] [facility] floor       [address], [place]         Phone: [phone]       Fax: [phone] [] [address] [facility] at [address] [facility] , [address] [facility]  Phone: [phone]  Fax: [phone]     Occupational Therapy Daily Treatment Note    Date:  10/19/2017  Patient Name:  Tonia Buck YOB: 1955  MRN: [id number]  Patient: Tonia Buck                                  : 1955                                            MRN: [id number]                                             Physician: Reina Killian MD                                         Insurance: Ellin Marcelo                Medical Diagnosis: Psoriatic arthritis, osteoarthritis                   Rehab Codes: Edema, localized R60.1; Pain in right/left hands M79.641/M79.645; pain in right/left fingers M79.644/M79.645;Muscle wasting/atrophy bilat mult sites M62.591/M62.592.     Onset Date: 2017                                          Next Dr. Jurado Abu: 12/15/17    Subjective:    Pain:  [x] Yes  [] No Location: Pain Rating: (0-10 scale) 3/10  Pain altered Tx:  [x] No  [] Yes  Action:  Comments:    Objective:  Modalities:   Exercises:  Flow Sheet:  Exercise Reps/Time Weight/Level Comments   Gentle PROM - Bilateral fiingers     Administered. Pt educ provided: verbal, demo. Bilateral- AROM - bilateral fingers 10 reps   Completed. Pt educ provided: Written, verbal, demo. Bilateral -Foam block -  10 reps Yellow Pt educ provided: Written, verbal, demo. Bilateral - Grasp and release with texture bins   beans Completed with good return   Bilateral - marbles - radial release     Completed with good return   Pt education - edema positioning and edema massage techniques     Planned for subsequent session. Other: Pt tolerated PROM of B hands at all joints with noted stiffness in all MCPs. Pt intrinsics demo minimal stiffness with stretching.  Pt reports stiffness in worst in the am after sleeping. Advised pt of warm-up stretching, or other techniques, such as a hot shower or heat applications are helpful to relax muscles prior to exercise. Ice application after the routine can help minimize post-exercise soreness and inflammation. In general, exercises for arthritis are performed for the purpose of strengthening and maintaining or improving joint range of motion, and should be done on a regular basis for best results. Pt demo good understanding. Pt not scheduled for 1 month trip to [facility] as of yet. Pt has grandson being born 6-24 or 10-20 and wishes to go help after child is born. Pt to keep therapy advised of prolonged trip. Specific Instructions for next treatment:    Treatment Charges: Mins Units   []  Modalities     [x]  Ther Exercise 45 3   [x]  Manual Therapy 15 1   []  Ther Activities     []  Other         Assessment: [x] Progressing toward goals. [] No change. [] Other:  STG/LTG      Pt. Education:  [x] Yes  [] No  [] Reviewed Prior HEP/Ed  Method of Education: [x] Verbal  [] Demo  [] Written  Comprehension of Education:  [x] Verbalizes understanding. [] Demonstrates understanding. [] Needs review. [] Demonstrates/verbalizes HEP/Ed previously given.       Plan:  Continue with current plan for increased ROM and cautious strengthening         Time In/Out: 5626-0652  Total Treatment Time:  61       Min      Electronically signed by:  RYAN Borjas/ELISE

## 2017-10-20 ENCOUNTER — HOSPITAL ENCOUNTER (OUTPATIENT)
Dept: OCCUPATIONAL THERAPY | Age: 62
Setting detail: THERAPIES SERIES
Discharge: HOME | End: 2017-10-20
Payer: COMMERCIAL

## 2017-10-20 PROCEDURE — 97110 THERAPEUTIC EXERCISES: CPT

## 2017-10-21 ENCOUNTER — OFFICE VISIT (OUTPATIENT)
Dept: NEUROLOGY | Age: 62
End: 2017-10-21
Payer: COMMERCIAL

## 2017-10-21 DIAGNOSIS — M54.12 CERVICAL RADICULOPATHY: Primary | ICD-10-CM

## 2017-10-21 DIAGNOSIS — G56.03 BILATERAL CARPAL TUNNEL SYNDROME: ICD-10-CM

## 2017-10-21 PROCEDURE — 95910 NRV CNDJ TEST 7-8 STUDIES: CPT | Performed by: PSYCHIATRY & NEUROLOGY

## 2017-10-21 PROCEDURE — 95886 MUSC TEST DONE W/N TEST COMP: CPT | Performed by: PSYCHIATRY & NEUROLOGY

## 2017-10-21 NOTE — PROGRESS NOTES
Mid palm-Wrist (Ulnar) 1.2 ms 80 mm 65 m/s   Median. L          Wrist (Median) 1.4 ms 2.6 ms 38 mV Mid palm-Wrist (Median) 1.4 ms 80 mm 57 m/s     ms  ms  mV Wrist (Median)-Wrist (Ulnar) 0.0 ms  mm  m/s   Ulnar. L          Wrist (Ulnar) 1.4 ms 1.8 ms 22 mV Mid palm-Wrist (Ulnar) 1.4 ms 80 mm 57 m/s         EMG Summary Table     Spontaneous MUAP Recruitment    IA Fib PSW Fasc H.F. Amp Dur. PPP Pattern   R. FIRST D INTEROSS N None None None None N N N N   R. Deltoid N None None None None N N N N   R. Pronator teres N None None None None N N N N   R. BICEPS + + + + None N N N N   R. Brachioradialis + + + + None N N N N   R. TRICEPS N None None None None N N N N   R. CERV PSP (U) N None None None        R. CERV PSP (M) N None None None        R. CERV PSP (L) + + + +                 EMG Summary Table     Spontaneous MUAP Recruitment    IA Fib PSW Fasc H.F. Amp Dur. PPP Pattern   L. FIRST D INTEROSS N None None None None N N N N   L. EXT IND PROP. N None None None None N N N N   L. Pronator teres N None None None None N N N N   L. BICEPS N None None None None N N N N   L. DELTOID N None None None None N N N N   L. TRICEPS N None None None None N N N N   L. CERV PSP (U) N None None None        L. CERV PSP (M) N None None None        L. CERV PSP (L) N None None None            Bilateral median motor studies showed normal DML's, CMAP amplitudes and motor nerve conduction velocities. Bilateral ulnar motor studies showed normal DML's, CMAP amplitudes and motor nerve conduction velocities. Bilateral median transcarpal orthodromic sensory studies showed prolonged peak latency, normal amplitudes and slow sensory nerve conduction velocities. Bilateral ulnar orthodromic transcarpal sensory responses showed normal peak latencies, amplitudes and sensory nerve conduction velocities. Monopolar study of the selected muscles reveal evidence of active denervation in right brachioradialis and biceps.   Full interference pattern was seen.    Conclusion: This is an abnormal electrophysiological study indicative of mild to moderate bilateral median sensory neuropathies at the level of flexor retinaculum i.e. carpal tunnel syndrome. There is also evidence of subacute right C6 cervical radiculopathy.   Clinical correlation is recommended.        __________________________________  Meg Maldonado MD  Diplomat American Board of Psychiatry and Neurology  [address] [facility]

## 2017-10-23 ENCOUNTER — HOSPITAL ENCOUNTER (OUTPATIENT)
Dept: OCCUPATIONAL THERAPY | Age: 62
Setting detail: THERAPIES SERIES
Discharge: HOME | End: 2017-10-23
Payer: COMMERCIAL

## 2017-10-23 PROCEDURE — 97110 THERAPEUTIC EXERCISES: CPT

## 2017-10-23 PROCEDURE — 97140 MANUAL THERAPY 1/> REGIONS: CPT

## 2017-10-24 ENCOUNTER — HOSPITAL ENCOUNTER (OUTPATIENT)
Dept: OCCUPATIONAL THERAPY | Age: 62
Setting detail: THERAPIES SERIES
Discharge: HOME | End: 2017-10-24
Payer: COMMERCIAL

## 2017-10-24 PROCEDURE — 97140 MANUAL THERAPY 1/> REGIONS: CPT

## 2017-10-24 PROCEDURE — 97110 THERAPEUTIC EXERCISES: CPT

## 2017-10-24 NOTE — FLOWSHEET NOTE
[x] [address] [facility] floor       [address], [place]         Phone: [phone]       Fax: [phone] [] [address] [facility] at [address] [facility] , [address]  Phone: [phone]  Fax: [phone]     Occupational Therapy Daily Treatment Note    Date:  10/24/2017  Patient Name:  Ambrose Ballard    :  1955  MRN: [id number]  Patient: Ambrose Ballard                                  : 1955                                            MRN: [id number]                                             Physician: Cesar Lucas MD                                         Insurance: [employer] Sequin                Medical Diagnosis: Psoriatic arthritis  L40.50, Bilateral osteoarthritis M19.041/M19.042. Rehab Codes: Edema, localized R60.1; Pain in right/left hands M79.641/M79.645; pain in right/left fingers M79.644/M79.645;Muscle wasting/atrophy bilat mult sites M62.591/M62.592.     Onset Date: 2017                                          Next Dr. Lopez Atwater: 12/15/17  #Visits/Total Visits:     Subjective:    Pain:  [x] Yes  [] No Location: Pain Rating: (0-10 scale)  Bilateral 3/10  Pain altered Tx:  [x] No  [] Yes  Action:  Comments: NA    Objective:  Modalities: NA at this time    Exercises:  Flow Sheet:  Exercise Reps/Time Weight/Level Comments   Gentle PROM - Bilateral fiingers     Administered. Bilateral- AROM - bilateral fingers 15 reps   Completed. Bilateral -Foam block -  10 reps Yellow Completed. Bilateral - Grasp and release with texture bins 1/2 bin ea.  Beans  Completed. Bilateral - marbles - radial release 1 series ea.    Completed. Pt education - edema positioning and edema massage techniques     At home prn. Resistive /pinches with putty 10 reps ea. Yellow Completed. Comments/Assessment: Bilateral finger pads 4 cm from touching palms on arrival, all touching palms following PROM.

## 2017-10-26 ENCOUNTER — APPOINTMENT (OUTPATIENT)
Dept: OCCUPATIONAL THERAPY | Age: 62
End: 2017-10-26
Payer: COMMERCIAL

## 2017-10-27 ENCOUNTER — HOSPITAL ENCOUNTER (OUTPATIENT)
Dept: OCCUPATIONAL THERAPY | Age: 62
Setting detail: THERAPIES SERIES
Discharge: HOME | End: 2017-10-27
Payer: COMMERCIAL

## 2017-10-27 PROCEDURE — 97140 MANUAL THERAPY 1/> REGIONS: CPT

## 2017-10-27 PROCEDURE — 97110 THERAPEUTIC EXERCISES: CPT

## 2017-10-27 NOTE — FLOWSHEET NOTE
[x] [address] [facility] floor       [address], [place]         Phone: [phone]       Fax: [phone] [] [address] [facility] at [address] [facility] , [address]  Phone: [phone]  Fax: [phone]     Occupational Therapy Daily Treatment Note    Date:  10/27/2017  Patient Name:  Yisel Rainey YOB: 1955  MRN: [id number]  Patient: Yisel Rainey                                  : 1955                                            MRN: [id number]                                             Physician: Kirk Sheets MD                                         Insurance: [facility]                Medical Diagnosis: Psoriatic arthritis  L40.50, Bilateral osteoarthritis M19.041/M19.042. Rehab Codes: Edema, localized R60.1; Pain in right/left hands M79.641/M79.645; pain in right/left fingers M79.644/M79.645;Muscle wasting/atrophy bilat mult sites M62.591/M62.592.     Onset Date: 2017                                          Next Dr. Knight Finer: 12/15/17  #Visits/Total Visits:     Subjective:    Pain:  [x] Yes  [] No Location: Pain Rating: (0-10 scale)  Bilateral 3/10  Pain altered Tx:  [x] No  [] Yes  Action:  Comments: NA    Objective:  Modalities: NA at this time    Exercises:  Flow Sheet:  Exercise Reps/Time Weight/Level Comments   Gentle PROM - Bilateral fiingers     Administered to right only, left not needed today. Bilateral- AROM - bilateral fingers 20 reps   Increased reps. Bilateral -Foam block -  10 reps Yellow Completed. Bilateral - Grasp and release with texture bins 1/2 bin ea.  Beans  Completed. Bilateral - marbles - radial release 1 series ea.    Completed. Pt education - edema positioning and edema massage techniques     At home prn. Resistive /pinches with putty 10 reps ea. Yellow Completed.            Comments/Assessment: Left hand finger pads touching palm on arrival due to minimal hand and finger edema. Right finger pads 4 cm from touching palms on arrival and following PROM. Continues to have full bilateral active finger extension. Pt reports edema positioning techniques are beneficial at home. Ex's completed as outlined above, with good return. Occasional mild stenosing of bilateral middle and ring fingers observed with active fisting. Pt requesting shortened treatment session due to appointment conflict, pt accommodated. Pt scheduled for therapy up until trip to [place] (11/04/17- 12/02/17) and for two weeks following return through 12/14/17. Specific Instructions for next treatment:    Treatment Charges: Mins Units   []  Modalities     [x]  Ther Exercise 24 2   [x]  Manual Therapy 16 1   []  Ther Activities     []  Other         Assessment: [x] Progressing toward goals. [] No change. [] Other:    Short Term Goals: ( 10   Treatments)  1. Decrease Pain: Will have 0/10 pain at rest. Will have 2/10 pain with mildly resistive activity. 2. Increase AROM (degrees): Will be able to consistently make a loose fist with both hands, touching all fingers to palm. 3. Increase strength (pounds): will have 30 pounds of  strength bilaterally. 4. Increase function:  DASH score will be 60% functionally impaired or less  5. Decrease Edema: Pt will report a feeling of less fullness in bilateral hands. 6. Independent with Home Exercise Program in 2 sessions. 7.   Edema: Pt will voice positioning strategies for edema positioning of hands and demonstrated edema massage techniques.     Long Term Goals: (  20  Treatments)  1. Pain:  Will have 2/10 pain with moderately resistive activity. 2. AROM: Will be able to make full active fists with both hands consistently.   3. Function:  DASH score will be 45% functionally impaired or less.        Patient Goals: Start basic exercise of fingers/hands, be able to open jars, cut food, cook, have more strength, write more

## 2017-10-31 ENCOUNTER — HOSPITAL ENCOUNTER (OUTPATIENT)
Dept: OCCUPATIONAL THERAPY | Age: 62
Setting detail: THERAPIES SERIES
Discharge: HOME | End: 2017-10-31
Payer: COMMERCIAL

## 2017-10-31 PROCEDURE — 97110 THERAPEUTIC EXERCISES: CPT

## 2017-10-31 PROCEDURE — 97140 MANUAL THERAPY 1/> REGIONS: CPT

## 2017-10-31 RX ORDER — ANASTROZOLE 1 MG/1
TABLET ORAL
Qty: 30 TABLET | Refills: 0 | Status: SHIPPED | OUTPATIENT
Start: 2017-10-31 | End: 2017-11-28 | Stop reason: SDUPTHER

## 2017-10-31 NOTE — FLOWSHEET NOTE
touching palm on arrival. Following PROM, left hand finger pads touching palm, left 1cm from touching palm. Continues to have full bilateral active finger extension. Pt reports leaving HEP handouts and foam block/putty at friend's house in [facility]. Items re-supplied due to history of MRSA. Ex's completed as outlined above, with good return. Pt scheduled for therapy up until trip to [place] (11/04/17- 12/02/17) and for two weeks following return through 12/14/17. Specific Instructions for next treatment:     Treatment Charges: Mins Units   []  Modalities     [x]  Ther Exercise 26 2   [x]  Manual Therapy 22 1   []  Ther Activities     []  Other         Assessment: [x] Progressing toward goals. [] No change. [] Other:    Short Term Goals: ( 10   Treatments)  1. Decrease Pain: Will have 0/10 pain at rest. Will have 2/10 pain with mildly resistive activity. 2. Increase AROM (degrees): Will be able to consistently make a loose fist with both hands, touching all fingers to palm. 3. Increase strength (pounds): will have 30 pounds of  strength bilaterally. 4. Increase function:  DASH score will be 60% functionally impaired or less  5. Decrease Edema: Pt will report a feeling of less fullness in bilateral hands. 6. Independent with Home Exercise Program in 2 sessions. 7.   Edema: Pt will voice positioning strategies for edema positioning of hands and demonstrated edema massage techniques.     Long Term Goals: (  20  Treatments)  1. Pain:  Will have 2/10 pain with moderately resistive activity. 2. AROM: Will be able to make full active fists with both hands consistently. 3. Function:  DASH score will be 45% functionally impaired or less.        Patient Goals: Start basic exercise of fingers/hands, be able to open jars, cut food, cook, have more strength, write more legibly.       Pt.  Education:  [] Yes  [x] No  [] Reviewed Prior HEP/Ed  Method of Education: [] Verbal  [] Demo  [] Written  Re:  Comprehension of Education:  [] Verbalizes understanding. [] Demonstrates understanding. [] Needs review. [] Demonstrates/verbalizes HEP/Ed previously given.       Plan:  Continue with current plan for increased ROM and cautious strengthening       Time In/Out: 1210 - 6868  Total Treatment Time:   50  Min      Electronically signed by:  RYAN Dougherty/ELISE, CHT 31-Jul-2017 09:13

## 2017-11-01 ENCOUNTER — HOSPITAL ENCOUNTER (OUTPATIENT)
Dept: OCCUPATIONAL THERAPY | Age: 62
Setting detail: THERAPIES SERIES
Discharge: HOME | End: 2017-11-01
Payer: COMMERCIAL

## 2017-11-01 DIAGNOSIS — E78.1 HYPERTRIGLYCERIDEMIA: ICD-10-CM

## 2017-11-01 PROCEDURE — 97140 MANUAL THERAPY 1/> REGIONS: CPT

## 2017-11-01 PROCEDURE — 97110 THERAPEUTIC EXERCISES: CPT

## 2017-11-01 RX ORDER — SIMVASTATIN 40 MG
TABLET ORAL
Qty: 30 TABLET | Refills: 3 | Status: SHIPPED | OUTPATIENT
Start: 2017-11-01 | End: 2018-11-24

## 2017-11-01 NOTE — TELEPHONE ENCOUNTER
vic request for SIMVASTATIN 40MG TABLET  future appointment scheduled.     Health Maintenance   Topic Date Due    Zostavax vaccine  07/05/2017    Breast cancer screen  09/07/2018    Cervical cancer screen  11/15/2019    Diabetes screen  07/03/2020    Lipid screen  10/12/2022    Colon cancer screen colonoscopy  10/13/2024    DTaP/Tdap/Td vaccine (3 - Td) 10/04/2027    Flu vaccine  Completed    Hepatitis C screen  Addressed    HIV screen  Addressed             (applicable per patient's age: Cancer Screenings, Depression Screening, Fall Risk Screening, Immunizations)    Hemoglobin A1C (%)   Date Value   07/03/2017 6.1   06/08/2016 5.7   02/01/2013 5.9     LDL Cholesterol (mg/dL)   Date Value   10/12/2017 104     AST (U/L)   Date Value   02/01/2013 20     ALT (U/L)   Date Value   02/01/2013 18     BUN (mg/dL)   Date Value   09/20/2017 14      (goal A1C is < 7)   (goal LDL is <100) need 30-50% reduction from baseline     BP Readings from Last 3 Encounters:   10/10/17 136/82   10/09/17 (!) 133/91   09/13/17 (!) 135/90    (goal /80)      All Future Testing planned in [facility]:  Lab Frequency Next Occurrence   Lipid Panel Once 11/20/2017   Comprehensive Metabolic Panel Once 85/44/1855   MINOR DIGITAL DIAGNOSTIC UNILATERAL Once 09/13/2017       Next Visit Date:  Future Appointments  Date Time Provider Gayathri Virk   11/3/2017 8:15 AM Avery Corona, OTR/L [facility]   12/4/2017 10:00 AM Kojo Sweeney, OTR/L [facility]   12/6/2017 8:00 AM Kojo Sweeney, OTR/L [facility]   12/7/2017 8:00 AM Zari Hunt, OTR/L [facility]   12/12/2017 8:00 AM Avery Abdih, OTR/L [facility]   12/14/2017 8:00 AM Zari Grady, OTR/L [facility]   12/18/2017 4:20 PM James Escamilla MD Neuro Spec [facility]   12/28/2017 2:40 PM Marquis Tangela MD [facility]   3/5/2018 8:00 AM STA MAMMO RM 1 [facility] M[facility]MO [facility]   3/14/2018 8:40 AM Jimenez Wood MD [facility]

## 2017-11-01 NOTE — FLOWSHEET NOTE
Written  Re:  Comprehension of Education:  [] Verbalizes understanding. [] Demonstrates understanding. [] Needs review. [] Demonstrates/verbalizes HEP/Ed previously given.       Plan:  Continue with current plan for increased ROM and cautious strengthening       Time In/Out: 0803 - 0900  Total Treatment Time:  62  Min      Electronically signed by:  EFRAIN Mayen/ELISE

## 2017-11-03 ENCOUNTER — HOSPITAL ENCOUNTER (OUTPATIENT)
Dept: OCCUPATIONAL THERAPY | Age: 62
Setting detail: THERAPIES SERIES
Discharge: HOME | End: 2017-11-03
Payer: COMMERCIAL

## 2017-11-03 PROCEDURE — 97110 THERAPEUTIC EXERCISES: CPT

## 2017-11-03 PROCEDURE — 97530 THERAPEUTIC ACTIVITIES: CPT

## 2017-11-03 NOTE — PROGRESS NOTES
[x] [id number] [facility] floor       [address], [place]         Phone: [phone]       Fax: [phone] [] [address] [facility] at [address] , [address] [place] [address] Road  Phone: [phone]  Fax: [phone]       Occupational Therapy Hand & Upper Extremity  Progress Note      Date: 11/3/2017      Patient: Juan Dale  : 1955  MRN: [id number]    Physician: Morgan Walters MD    Insurance: [employer]     Medical Diagnosis: Psoriatic arthritis  L40.50, Bilateral osteoarthritis M19.041/M19.042. Rehab Codes: Edema, localized R60.1; Pain in right/left hands M79.641/M79.645; pain in right/left fingers M79.644/M79.645;Muscle wasting/atrophy bilat [facility] sites M62.591/M62.592. Onset Date: 2017    Next Dr. Paulette Ramirez: 12/15/17  #Visits/Totoal Visits:      Past Medical History: [  ] Unremarkable  [  ] MI/Heart Problems  [  ] Refer to full medical chart in EPIC  [  ] Diabetes  [ X ] Tristin Rajiv breast  [  ] HTN  [ X ] Arthritis  [  ] Pacemaker  [ X ] Other: Right carpal tunnel release  Medications:  [  Rushie Daily Refer to full medical chart in [facility]  [  ] None  [  ] Other:  Allergies:  [  ] Refer to full medical chart in EPIC  [  ] None  [ X ] Other: Bactrim (sulfa)       Mechanism of Injury: NA  Surgery Date: NA    Precautions:   []None [] Fall Risk []WB Status [] Pacemaker [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 Critical Job/Daily Task Description: Self care, household tasks, works as a part time PAT nurse. Work Status: [x] Normal [] Restricted [] Off D/T Injury/Condition [] Retired [] Unemployed [] Disabled []Other:  Orthosis:  NA   [] Currently has [] To be custom fabricated this date []Planned for subsequent visit  Type:      Subjective:  Chief Complaint: Unable to fully close hands, making work, cooking tasks difficult.   Pain: Intensity: WFL/52 WFL/62 (WNL)          Initially no finger pads touching palm, following gentle PROM all finger pads touching palm. Middle and ring fingers are triggering with active composite flexion. DIGITS:  Initial 10/17/17       Extension/Flexion  Left       AROM INDEX MIDDLE RING LITTLE   MCP WFL/60 WFL/65 WFL/55 WFL/47   PIP WFL/75 WFL/85 WFL/92 WFL/75   DIP WFL52 WFL/64 WFL/50 WFL/65          Initially no finger pads touching palm, following gentle PROM all finger pads touching palm. Middle finger triggered once with active composite flexion. STRENGTH:  Current 11/03/17    RIGHT LEFT    25 31.6   Lateral pinch 15 10   2 point pinch   9   8  With pain   3 jaw pinch 10   7  With pain   STRENGTH:      Comparison  Initial 10/17/17    RIGHT LEFT    21.3 27.3   Lateral pinch 13 12   2 point pinch   9   8  with pain   3 jaw pinch 10   8  with pain     Edema: Generalized bilateral hand and finger edema decreased by observation and pt report, hand edema indicative of psoriatic arthritis. Sensation:  No complaints. Problems:     [x] Pain       [x] ROM      [x] Strength  [x] Function     [] Adherent Scar    [x] Edema     [] Open Wound    [] Coordination    [] Sensation     [] Falls: History/Risk of   []          Short Term Goals: ( 10   Treatments)  1. Decrease Pain: Will have 0/10 pain at rest -Unmet. Will have 2/10 pain with mildly resistive activity -MET for left hand only. 2. Increase AROM (degrees): Will be able to consistently make a loose fist with both hands, touching all fingers to palm -Improved, Unmet - can now do inconsistently. 3. Increase strength (pounds): will have 30 pounds of  strength bilaterally -MET for Left hand. 4. Increase function:  DASH score will be 60% functionally impaired or less -Unmet (73%). 5. Decrease Edema: Pt will report a feeling of less fullness in bilateral hands -MET. 6. Independent with Home Exercise Program in 2 sessions -MET.   7.   Edema: Pt will voice positioning strategies for edema positioning of hands and demonstrated edema massage techniques -MET. Long Term Goals: (  20  Treatments)  1. Pain:  Will have 2/10 pain with moderately resistive activity. Ongoing. 2. AROM: Will be able to make full active fists with both hands consistently. Ongoing. 3. Function:  DASH score will be 45% functionally impaired or less. Ongoing. Patient Goals: Start basic exercise of fingers/hands, be able to open jars, cut food, cook, have more strength, write more legibly. Comments/Assessment: Pt referred for bilateral psoriatic arthritis and osteoarthritis of the hands. Pt states she is having difficulty making fists, although she has satisfactory finger extension, and functional bilateral thumb motion. Both hands and fingers have diffuse edema, which has improved by pt report since start of therapy. Pain now 4/10 in right hand, 2/10 in left hand; both improved. She initially reported having difficulty with work tasks including taking blood pressure readings, and home tasks especially cooking and using a knife - all improved, including cutting onions. Following gentle AROM pt able to make bilateral functional fists (not tight fists). There is occasional mild stenosing of the right middle and ring fingers, and left middle finger observed with fisting, although none observed or reported by pt this date. .  Recommendations: Pt scheduled for therapy through today. Pt will be out of state until 12/02/17. Plan to see for two weeks following return through 12/14/17. Pt sees physician 12/15/17. Home ex program reviewed prior to trip. Modalities: NA at this time     Exercises:  Flow Sheet:  Exercise Reps/Time Weight/Level Comments   Gentle PROM - Bilateral fiingers     Pt declined, work conflict. Bilateral- AROM - bilateral fingers 20 reps   Completed. Bilateral -Foam block -  10 reps Yellow Not Completed.    Bilateral - Grasp and release with texture bins 1/2 bin ea.  Beans  Completed. Bilateral - marbles - radial release 1 series ea.    Completed. Pt education - edema positioning and edema massage techniques     At home prn. Resistive /pinches with putty 10 reps ea. Yellow Not Completed.         Specific Instructions for next treatment:      Treatment Charges: Mins Units   []  Modalities       [x]  Ther Exercise    22     1   [x]  Manual Therapy      0     0   [x]  Ther Activities    22     1   []  Other          Pt. Education:  [] Yes  [x] No  [] Reviewed Prior HEP/Ed  Method of Education: [] Verbal  [] Demo  [] Written  Re:  Comprehension of Education:  [] Verbalizes understanding. [] Demonstrates understanding. [] Needs review. [] Demonstrates/verbalizes HEP/Ed previously given.        Plan:  Continue with current plan for increased ROM and cautious strengthening            Total Treatment Time:  40  Min.      Time In/Time Out: 2925 - 8145       Electronically signed by RYAN Hoffmann/L, CHT on 11/3/2017 at 8:10 AM

## 2017-11-06 ENCOUNTER — TELEPHONE (OUTPATIENT)
Dept: ONCOLOGY | Age: 62
End: 2017-11-06

## 2017-11-06 NOTE — TELEPHONE ENCOUNTER
Good Morning, Dr. Paul Heath,   As you know, I Solomon Silvestres) have been taking anastrozole 1 mg from Tamoxifen since Jan 26, 2017. I switched anastrozole from Tamoxifen because Tamoxifen possibly cause cervical cancer.     Since August,  I have been suffering with fingers and hands pain. MRI of cervix done and Neurologist think spinal Cervical stenosis is not bad for my age. EMG showed carpel tunnel syndrome. I also visited Rheumatologist who thinks I have osteo- arthritis and I am on Mobic daily & motrin PRN with hand physical therapy. Physical therapy does not improve my movement of my hands and fingers.       Do you think I can go back to Tamoxifen or any other meds I can try? Sorry for the troubles. Renetta Salvador  [phone], Femi Hernandez [email or web address]       Please advise.

## 2017-11-15 NOTE — TELEPHONE ENCOUNTER
Spoke with Dr Rebecca Gunter and he does not think her problems are due to her medication.     Writer calls and speaks with Celina Marcial and she verbalizes understanding

## 2017-11-27 RX ORDER — ALENDRONATE SODIUM 70 MG/1
TABLET ORAL
Qty: 4 TABLET | Refills: 1 | Status: SHIPPED | OUTPATIENT
Start: 2017-11-27 | End: 2018-02-02 | Stop reason: SDUPTHER

## 2017-11-29 RX ORDER — ANASTROZOLE 1 MG/1
TABLET ORAL
Qty: 30 TABLET | Refills: 0 | Status: SHIPPED | OUTPATIENT
Start: 2017-11-29 | End: 2018-01-02 | Stop reason: SDUPTHER

## 2017-12-04 ENCOUNTER — HOSPITAL ENCOUNTER (OUTPATIENT)
Dept: OCCUPATIONAL THERAPY | Age: 62
Setting detail: THERAPIES SERIES
Discharge: HOME | End: 2017-12-04
Payer: COMMERCIAL

## 2017-12-04 PROCEDURE — 97110 THERAPEUTIC EXERCISES: CPT

## 2017-12-04 PROCEDURE — 97140 MANUAL THERAPY 1/> REGIONS: CPT

## 2017-12-04 NOTE — FLOWSHEET NOTE
Yellow Completed. Comments/Assessment: Pt reports pain as documented above. Pt demo difficulty completing ROM with 3rd digits bilaterally, approx 4 cm from  palm. Pt completed AROM and theraputty exercises. Pt reports 9/10 pain with movement. Following PROM, pt demo 1 cm from palm on L hand, 2 cm on R. Pt reports 5/10 pain in R digits and 4/10 pain in L digits on completion of therapy. Specific Instructions for next treatment:     Treatment Charges: Mins Units   []  Modalities     [x]  Ther Exercise    15     1   [x]  Manual Therapy    45     3   []  Ther Activities     []  Other         Assessment: [x] Progressing toward goals. [] No change. [] Other:    Short Term Goals: ( 10   Treatments)  1. Decrease Pain: Will have 0/10 pain at rest. Will have 2/10 pain with mildly resistive activity. 2. Increase AROM (degrees): Will be able to consistently make a loose fist with both hands, touching all fingers to palm. 3. Increase strength (pounds): will have 30 pounds of  strength bilaterally. 4. Increase function:  DASH score will be 60% functionally impaired or less  5. Decrease Edema: Pt will report a feeling of less fullness in bilateral hands. 6. Independent with Home Exercise Program in 2 sessions. 7.   Edema: Pt will voice positioning strategies for edema positioning of hands and demonstrated edema massage techniques.     Long Term Goals: (  20  Treatments)  1. Pain:  Will have 2/10 pain with moderately resistive activity. 2. AROM: Will be able to make full active fists with both hands consistently. 3. Function:  DASH score will be 45% functionally impaired or less.        Patient Goals: Start basic exercise of fingers/hands, be able to open jars, cut food, cook, have more strength, write more legibly.       Pt.  Education:  [] Yes  [] No  [x] Reviewed Prior HEP/Ed  Method of Education: [x] Verbal  [] Demo  [] Written  Re:  Comprehension of Education:  [] [employer] understanding. [] Demonstrates understanding. [] Needs review. [x] Demonstrates/verbalizes HEP/Ed previously given.       Plan:  Continue with current plan for increased ROM and cautious strengthening       Time In/Out: 1000 - 1102  Total Treatment Time: 58  Min      Electronically signed by:  RYAN Licona/ELISE

## 2017-12-06 ENCOUNTER — HOSPITAL ENCOUNTER (OUTPATIENT)
Dept: OCCUPATIONAL THERAPY | Age: 62
Setting detail: THERAPIES SERIES
Discharge: HOME | End: 2017-12-06
Payer: COMMERCIAL

## 2017-12-06 PROCEDURE — 97530 THERAPEUTIC ACTIVITIES: CPT

## 2017-12-06 PROCEDURE — 97140 MANUAL THERAPY 1/> REGIONS: CPT

## 2017-12-06 NOTE — FLOWSHEET NOTE
Completed. Comments/Assessment: Pt reports pain as documented above. Pt demo difficulty completing ROM with 3rd digits bilaterally, and 4th digit on Right, approx 4 cm from  palm. Pt completed above therapeutic exercises with min difficulty. Pain increases to 9/10 with PROM. Pt reports 5/10 pain in R digits and 4/10 pain in L digits on completion of therapy. Specific Instructions for next treatment:     Treatment Charges: Mins Units   []  Modalities     [x]  Ther Exercise    45     3   [x]  Manual Therapy    15     1   []  Ther Activities     []  Other         Assessment: [x] Progressing toward goals. [] No change. [] Other:    Short Term Goals: ( 10   Treatments)  1. Decrease Pain: Will have 0/10 pain at rest. Will have 2/10 pain with mildly resistive activity. 2. Increase AROM (degrees): Will be able to consistently make a loose fist with both hands, touching all fingers to palm. 3. Increase strength (pounds): will have 30 pounds of  strength bilaterally. 4. Increase function:  DASH score will be 60% functionally impaired or less  5. Decrease Edema: Pt will report a feeling of less fullness in bilateral hands. 6. Independent with Home Exercise Program in 2 sessions. 7.   Edema: Pt will voice positioning strategies for edema positioning of hands and demonstrated edema massage techniques.     Long Term Goals: (  20  Treatments)  1. Pain:  Will have 2/10 pain with moderately resistive activity. 2. AROM: Will be able to make full active fists with both hands consistently. 3. Function:  DASH score will be 45% functionally impaired or less.        Patient Goals: Start basic exercise of fingers/hands, be able to open jars, cut food, cook, have more strength, write more legibly.       Pt. Education:  [] Yes  [] No  [x] Reviewed Prior HEP/Ed  Method of Education: [x] Verbal  [] Demo  [] Written  Re:  Comprehension of Education:  [] Verbalizes understanding.   [] Demonstrates understanding. [] Needs review. [x] Demonstrates/verbalizes HEP/Ed previously given. Plan:  Continue with current plan for increased ROM and cautious strengthening       Time In/Out: 0800 - 0900  Total Treatment Time:  61 Min.       Electronically signed by:  EFRAIN Sifuentes/ELISE

## 2017-12-07 ENCOUNTER — HOSPITAL ENCOUNTER (OUTPATIENT)
Dept: OCCUPATIONAL THERAPY | Age: 62
Setting detail: THERAPIES SERIES
Discharge: HOME | End: 2017-12-07
Payer: COMMERCIAL

## 2017-12-07 PROCEDURE — 97140 MANUAL THERAPY 1/> REGIONS: CPT

## 2017-12-07 PROCEDURE — 97110 THERAPEUTIC EXERCISES: CPT

## 2017-12-12 ENCOUNTER — HOSPITAL ENCOUNTER (OUTPATIENT)
Dept: OCCUPATIONAL THERAPY | Age: 62
Setting detail: THERAPIES SERIES
Discharge: HOME | End: 2017-12-12
Payer: COMMERCIAL

## 2017-12-12 PROCEDURE — 97530 THERAPEUTIC ACTIVITIES: CPT

## 2017-12-12 PROCEDURE — 97110 THERAPEUTIC EXERCISES: CPT

## 2017-12-12 NOTE — PROGRESS NOTES
[x] [address] [facility] floor       [address] [facility]         Phone: [phone]       Fax: [phone] [] [address] at [facility] [address] [place] [address] , [address] [place] [address] Road  Phone: [phone]  Fax: [phone]       Occupational Therapy Hand & Upper Extremity  Progress Note      Date: 2017      Patient: Percy Dang  : 1955  MRN: [id number]    Physician: Dianna Bravo MD    Insurance: Zoanne Mask     Medical Diagnosis: Psoriatic arthritis  L40.50, Bilateral osteoarthritis M19.041/M19.042. Rehab Codes: Edema, localized R60.1; Pain in right/left hands M79.641/M79.645; pain in right/left fingers M79.644/M79.645;Muscle wasting/atrophy bilat [facility] sites M62.591/M62.592. Onset Date: 2017    Next Dr. Paul Rahman: 12/15/17  #Visits/Totoal Visits:      Past Medical History: [  ] Unremarkable  [  ] MI/Heart Problems  [  ] Refer to full medical chart in EPIC  [  ] Diabetes  [ X ] [place] breast  [  ] HTN  [ X ] Arthritis  [  ] Pacemaker  [ X ] Other: Right carpal tunnel release  Medications:  [  Agnes Dwyer Refer to full medical chart in [facility]  [  ] None  [  ] Other:  Allergies:  [  ] Refer to full medical chart in EPIC  [  ] None  [ X ] Other: Bactrim (sulfa)       Mechanism of Injury: NA  Surgery Date: NA    Precautions:   []None [] Fall Risk []WB Status [] Pacemaker [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   Pain: Intensity:  Right 4/10, Left 3/10 Location: bilateral hands  Pain Type: [x] Constant [] Intermittent   [  ] with pain meds at rest   [] With movement/Resistive activity [] With Sedentary activity    Pain Altered Tx: []Yes [x]No  Action Taken: NA    Objective:  Tests/Measurements:  Current Functional Level: 48% functionally impaired as measured with the DASH Functional Survey. 0-100 scale, with 0 = no Deficits  Previous Functional Level: 73% functionally impaired as measured with the DASH Functional Survey. Initial Functional Level:  75% functionally impaired as measured with the DASH  A decrease of 15 or more %points is considered to be a significant improvement in functional abilities and symptoms. This individual has a  25% point decrease from the previous measurement. AROM,DIGITS:  Current 12/12/17       Extension/Flexion  Right     AROM INDEX MIDDLE RING LITTLE   MCP WFL/70 WFL/74 WFL/70 WFL/75   PIP WFL/105 WFL/88 WFL/75 WFL/100   DIP WFL/47 WFL/65 WFL/60 WFL/80    Finger pad touching palm Finger pad 1.5cm from touching palm Finger pad 2.cm from touching palm Finger pad touching palm   No triggering reported. AROM, DIGITS:    Comparison Previous 11/03/17       Extension/Flexion  Right     AROM INDEX MIDDLE RING LITTLE   MCP WFL/60 WFL/65 WFL/62 WFL/74   PIP WFL/80 WFL/77 WFL/87 WFL/76   DIP WFL/55 WFL/69 WFL/57 WFL/74    Finger pad touching palm Finger pad 0.5cm from touching palm Finger pad 0.5cm from touching palm Finger pad 0.2cm from touching palm   Bilateral thumbs are opposing to base of little fingers (WNL). Bilateral finger extension is either WNL or WFL and pt states she is not concerned with extension status. All marked below as [facility]. No triggering reported.     DIGITS  Current 11/03/17      Extension/Flexion  Left       AROM INDEX MIDDLE RING LITTLE   MCP WFL/62 WFL/62 WFL/65 WFL/73   PIP WFL/95 WFL/88 WFL/95 WFL/89   DIP WFL/62 WFL/70 WFL/64 WFL/64    Finger pad touching palm Finger pad 1.5cm from touching palm Finger pad touching palm Finger pad touching palm   No triggering reported. DIGITS  Comparison Previous 11/03/17      Extension/Flexion  Left       AROM INDEX MIDDLE RING LITTLE   MCP WFL/59 WFL/64 WFL/62 WFL/73   PIP WFL/89 WFL/95 WFL/104 WFL/95   DIP WFL/49 WFL/58 WFL/68 WFL/69    Finger pad touching palm Finger pad touching palm Finger pad touching palm Finger pad touching palm   No triggering reported. DIGITS:   Bilateral thumbs are opposing to base of little fingers (WNL). Bilateral finger extension is either WNL or WFL and pt states she is not concerned with extension status. All marked below as [facility]. Comparison  Initial 10/17/17       Extension/Flexion  Right     AROM INDEX MIDDLE RING LITTLE   MCP WFL/70 WFL/68 WFL/58 WFL/40   PIP WFL/50 WFL/58 WFL/65 WFL/50   DIP WFL/34 WFL/48 WFL/52 WFL/62 (WNL)          Initially no finger pads touching palm, following gentle PROM all finger pads touching palm. Middle and ring fingers are triggering with active composite flexion. DIGITS:  Initial 10/17/17       Extension/Flexion  Left       AROM INDEX MIDDLE RING LITTLE   MCP WFL/60 WFL/65 WFL/55 WFL/47   PIP WFL/75 WFL/85 WFL/92 WFL/75   DIP WFL52 WFL/64 WFL/50 WFL/65          Initially no finger pads touching palm, following gentle PROM all finger pads touching palm. Middle finger triggered once with active composite flexion.     STRENGTH:  Current 12/12/17    RIGHT LEFT    30.3 28.3   Lateral pinch 13 11   2 point pinch 10   9   3 jaw pinch   8   9     STRENGTH:  Comparison Previous 11/03/17    RIGHT LEFT    25 31.6   Lateral pinch 15 10   2 point pinch   9   8  With pain   3 jaw pinch 10   7  With pain     STRENGTH:      Comparison  Initial 10/17/17    RIGHT LEFT    21.3 27.3   Lateral pinch 13 12   2 point pinch   9   8  with pain   3 jaw pinch 10   8  with pain     Edema: Generalized bilateral hand and finger edema unchanged by observation and pt report, hand edema indicative of psoriatic arthritis. Sensation:  Pt reports diminished sensation in bilateral middle and ring ringers proximally to distal palmar crease (newly reported this date). Problems:     [x] Pain       [x] ROM      [x] Strength  [x] Function     [] Adherent Scar    [x] Edema     [] Open Wound    [] Coordination    [] Sensation     [] Falls: History/Risk of   []          Short Term Goals: ( 10   Treatments)  1. Decrease Pain: Will have 0/10 pain at rest -Unmet. Will have 2/10 pain with mildly resistive activity -Unmet bilaterally this date. 2. Increase AROM (degrees): Will be able to consistently make a loose fist with both hands, touching all fingers to palm -Unmet - can do inconsistently. 3. Increase strength (pounds): will have 30 pounds of  strength bilaterally -MET for Right hand. 4. Increase function:  DASH score will be 60% functionally impaired or less -MET(48%). 5. Decrease Edema: Pt will report a feeling of less fullness in bilateral hands -Unmet this date. 6. Independent with Home Exercise Program in 2 sessions -MET. 7.   Edema: Pt will voice positioning strategies for edema positioning of hands and demonstrated edema massage techniques -MET. Long Term Goals: (  20  Treatments)  1. Pain:  Will have 2/10 pain with moderately resistive activity. Ongoing. 2. AROM: Will be able to make full active fists with both hands consistently. Ongoing. 3. Function:  DASH score will be 45% functionally impaired or less. Ongoing. Patient Goals: Start basic exercise of fingers/hands -MET, be able to open jars -Improved, Unmet, cut food -Improved, Unmet, cook -MET, have more strength -Unmet, write more legibly -Improved, Unmet. Comments/Assessment: Pt referred for bilateral psoriatic arthritis and osteoarthritis of the hands. Pt has stated difficulty making fists, although demonstrates satisfactory finger extension, and functional bilateral thumb motion.  Both hands and fingers have Other          Pt. Education:  [] Yes  [x] No  [] Reviewed Prior HEP/Ed  Method of Education: [] Verbal  [] Demo  [] Written  Re:  Comprehension of Education:  [] Verbalizes understanding. [] Demonstrates understanding. [] Needs review. [] Demonstrates/verbalizes HEP/Ed previously given.        Plan:  Pt has cancelled remaining OT appointments, wanting to consult with physician team before determining whether further OT services  is desired. Pt to call if further therapy is desired.           Total Treatment Time:  52  Min.      Time In/Time Out: 0809 - 0856       Electronically signed by RYAN Delvalle/L, CHT on 12/12/2017 at 8:09 AM

## 2017-12-14 ENCOUNTER — APPOINTMENT (OUTPATIENT)
Dept: OCCUPATIONAL THERAPY | Age: 62
End: 2017-12-14
Payer: COMMERCIAL

## 2017-12-18 ENCOUNTER — OFFICE VISIT (OUTPATIENT)
Dept: NEUROLOGY | Age: 62
End: 2017-12-18
Payer: COMMERCIAL

## 2017-12-18 VITALS
WEIGHT: 139.4 LBS | BODY MASS INDEX: 28.1 KG/M2 | HEART RATE: 84 BPM | HEIGHT: 59 IN | DIASTOLIC BLOOD PRESSURE: 84 MMHG | SYSTOLIC BLOOD PRESSURE: 138 MMHG

## 2017-12-18 DIAGNOSIS — G56.03 BILATERAL CARPAL TUNNEL SYNDROME: Primary | ICD-10-CM

## 2017-12-18 PROCEDURE — 99214 OFFICE O/P EST MOD 30 MIN: CPT | Performed by: PSYCHIATRY & NEUROLOGY

## 2017-12-18 RX ORDER — IBUPROFEN 400 MG/1
400 TABLET ORAL EVERY 6 HOURS PRN
COMMUNITY
End: 2018-11-24

## 2017-12-18 ASSESSMENT — ENCOUNTER SYMPTOMS
EYES NEGATIVE: 1
ALLERGIC/IMMUNOLOGIC NEGATIVE: 1
GASTROINTESTINAL NEGATIVE: 1
RESPIRATORY NEGATIVE: 1

## 2017-12-18 NOTE — LETTER
Past Surgical History:   Procedure Laterality Date    APPENDECTOMY      BREAST LUMPECTOMY Right 2016    BREAST LUMPECTOMY Right 2016    exe rt breast    BREAST LUMPECTOMY Right     CARPAL TUNNEL RELEASE Right      SECTION  , ,     COLONOSCOPY      ENDOMETRIAL BIOPSY  2016    FINGER TRIGGER RELEASE Right 2003    FRACTURE SURGERY Right 2001    THUMB    UPPER GASTROINTESTINAL ENDOSCOPY         Family History   Problem Relation Age of Onset    Diabetes Mother     Breast Cancer Mother     High Blood Pressure Mother     Stroke Father     Cancer Father      Pancreas    Irritable Bowel Syndrome Father     Liver Cancer Brother     Liver Cancer Brother        Social History     Social History    Marital status:      Spouse name: Manoj Goodwin Number of children: 2    Years of education: 12     Occupational History    RN [employer]     Social History Main Topics    Smoking status: Never Smoker    Smokeless tobacco: Never Used    Alcohol use No    Drug use: No    Sexual activity: Yes     Partners: Male     Other Topics Concern    None     Social History Narrative    None       Current Outpatient Prescriptions   Medication Sig Dispense Refill    ibuprofen (ADVIL;MOTRIN) 400 MG tablet Take 400 mg by mouth every 6 hours as needed for Pain      anastrozole (ARIMIDEX) 1 MG tablet TAKE ONE TABLET BY MOUTH DAILY 30 tablet 0    alendronate (FOSAMAX) 70 MG tablet TAKE 1 TABLET BY MOUTH ONCE WEEKLY BEFORE BREAKFAST, ON AN EMPTY STOMACH: REMAIN UPRIGHT FOR 30 MINUTES:TAKE WITH 8 OUNCES OF WATER 4 tablet 1    simvastatin (ZOCOR) 40 MG tablet TAKE ONE TABLET BY MOUTH EVERY NIGHT AT BEDTIME 30 tablet 3    ketoconazole (NIZORAL) 2 % shampoo Apply topically daily as needed for Itching Apply topically daily as needed.  clobetasol (TEMOVATE) 0.05 % external solution Apply topically 2 times daily as needed Apply topically 2 times daily. Orientation Alert and oriented x 3   Language process and speech normal . No aphasia   Cranial nerve 2 normal acuety and visual fields  Cranial nerve 3, 4 and 6 . Extraocular muscles are intact . Pupils are equal and reactive   Cranial nerve 5 . Normal strength of masseter and temporalis . Intact corneal reflex. Normal facial sensation  Cranial nerve 7 normal exam   Cranial nerve 8. Grossly intact hearing   Cranial nerve 9 and 10. Symmetric palate elevation   Cranial nerve 11 , 5 out of 5 strength   Cranial Nerve 12 midline tongue . No atrophy  Sensation . Decreased pinprick and light touch bilateral hypothenar and 5th digit  Deep Tendon Reflexes normal  Plantar response flexor bilaterally    Assessment:      1. Bilateral carpal tunnel syndrome    She is to continue with motrin PRN along with using wrist splints at night to get surgical opinion if not to consider local wrist injections     Plan:      Orders Placed This Encounter   Procedures    External Referral To Plastic Surgery     Referral Priority:   Routine     Referral Type:   Consult for Advice and Opinion     Referral Reason:   Specialty Services Required     Requested Specialty:   Plastic Surgery     Number of Visits Requested:   1             If you have questions, please do not hesitate to call me. I look forward to following Mehdi Aguirre along with you.     Sincerely,        Annalisa Jones MD    CC providers:  Ryan Mora MD  [address] [place] [id number]  VIA In [facility]

## 2017-12-18 NOTE — PROGRESS NOTES
Subjective:      Patient ID: Tonia Buck is a 63 y.o. female. HPI    Active problem suspected bilateral carpal tunnel syndrome with bilateral hand pain with positive Tinel's at wrist bilaterally to use motrin 600 mg po tid PRN and undergo EMG study . The condition is she reports that her hand pain is better on motrin being at tolerable degree although there is palmar swelling with residual pain affecting also 3rd and 4th digit in right hand and 3rd finger in left hand awakening form her sleep. There is some decreased  strength opening bottles or holding shopping bags. Significant medications motrin 400 mg po qid PRN . Testing MRI of cervical spine mild circumferential disc bulging and posterior ridging producing mild spinal canal stenosis at C4-C5, C5-C6 accompanied with uncovertebral spurring producing marked left neural foraminal stenosis at C4-C5 and moderate left and mild right neural foraminal stenosis at C5-C6. Cristofer Nino Acute left C3-C4 synovitis/facet arthritis suspect producing moderate left neural foraminal stenosis, 2017.  EMG/NCV with mild to moderate bilateral median neuropathies at the wrist with also right subacute C6 radiculopathy , 2017     Past Medical History:   Diagnosis Date    Allergic rhinitis     Breast cancer[facility] right ([facility] [address] 2016    Ductal carcinoma in situ (DCIS) of right breast     Hyperlipidemia     currently off meds, per pt's choice    Psoriasis     Thickened endometrium 2016    while on Tamoxifen    Wears glasses        Past Surgical History:   Procedure Laterality Date    APPENDECTOMY      BREAST LUMPECTOMY Right 2016    BREAST LUMPECTOMY Right 2016    exe rt breast    BREAST LUMPECTOMY Right     CARPAL TUNNEL RELEASE Right      SECTION  , ,     COLONOSCOPY      ENDOMETRIAL BIOPSY  2016    FINGER TRIGGER RELEASE Right 2003    FRACTURE SURGERY Right     THUMB    UPPER GASTROINTESTINAL ENDOSCOPY Probiotic Product (PROBIOTIC DAILY PO) Take 1 tablet by mouth      omeprazole (PRILOSEC OTC) 20 MG tablet Take 1 tablet by mouth daily 30 tablet 3    cetirizine (ZYRTEC ALLERGY) 10 MG tablet Take 10 mg by mouth daily as needed        No current facility-administered medications for this visit. Allergies   Allergen Reactions    Sulfamethoxazole-Trimethoprim Rash       Review of Systems   HENT: Negative. Eyes: Negative. Respiratory: Negative. Cardiovascular: Negative. Gastrointestinal: Negative. Endocrine: Negative. Genitourinary: Negative. Musculoskeletal: Positive for myalgias. Allergic/Immunologic: Negative. Neurological: Positive for weakness and numbness. Hematological: Negative. Psychiatric/Behavioral: Negative. Objective:   Physical Exam    Vitals:    12/18/17 1610   BP: 138/84   Pulse: 84     weight: 139 lb 6.4 oz (63.2 kg)    Neurological Examination  Bilateral Tinels' at wrists  Constitutional .General exam well groomed   Ears /Nose/Throat: external ear . Normal exam  Neck and thyroid . Normal size  Respiratory . Breathsounds clear bilaterally  Cardiovascular: Auscultation of heart with regular rate and rhythm and normal heart sounds  Musculoskeletal. Muscle bulk and tone normal                                                           Muscle strength 5/5 strength throughout                                                                                 No dysmetria or dysdiadokinesis  No tremor   Normal fine motor  Orientation Alert and oriented x 3   Language process and speech normal . No aphasia   Cranial nerve 2 normal acuety and visual fields  Cranial nerve 3, 4 and 6 . Extraocular muscles are intact . Pupils are equal and reactive   Cranial nerve 5 . Normal strength of masseter and temporalis . Intact corneal reflex. Normal facial sensation  Cranial nerve 7 normal exam   Cranial nerve 8. Grossly intact hearing   Cranial nerve 9 and 10.  Symmetric palate

## 2017-12-19 NOTE — COMMUNICATION BODY
Subjective:      Patient ID: Almita Mendoza is a 63 y.o. female. HPI    Active problem suspected bilateral carpal tunnel syndrome with bilateral hand pain with positive Tinel's at wrist bilaterally to use motrin 600 mg po tid PRN and undergo EMG study . The condition is she reports that her hand pain is better on motrin being at tolerable degree although there is palmar swelling with residual pain affecting also 3rd and 4th digit in right hand and 3rd finger in left hand awakening form her sleep. There is some decreased  strength opening bottles or holding shopping bags. Significant medications motrin 400 mg po qid PRN . Testing MRI of cervical spine mild circumferential disc bulging and posterior ridging producing mild spinal canal stenosis at C4-C5, C5-C6 accompanied with uncovertebral spurring producing marked left neural foraminal stenosis at C4-C5 and moderate left and mild right neural foraminal stenosis at C5-C6. Maicol Ramal Acute left C3-C4 synovitis/facet arthritis suspect producing moderate left neural foraminal stenosis, 2017.  EMG/NCV with mild to moderate bilateral median neuropathies at the wrist with also right subacute C6 radiculopathy , 2017     Past Medical History:   Diagnosis Date    Allergic rhinitis     [facility] cancer, right ([facility] [address] 2016    Ductal carcinoma in situ (DCIS) of right breast     Hyperlipidemia     currently off meds, per pt's choice    Psoriasis     Thickened endometrium 2016    while on Tamoxifen    Wears glasses        Past Surgical History:   Procedure Laterality Date    APPENDECTOMY      BREAST LUMPECTOMY Right 2016    BREAST LUMPECTOMY Right 2016    exe rt breast    BREAST LUMPECTOMY Right     CARPAL TUNNEL RELEASE Right      SECTION  , ,     COLONOSCOPY      ENDOMETRIAL BIOPSY  2016    FINGER TRIGGER RELEASE Right 2003    FRACTURE SURGERY Right     THUMB    UPPER GASTROINTESTINAL ENDOSCOPY Family History   Problem Relation Age of Onset    Diabetes Mother     Breast Cancer Mother     High Blood Pressure Mother     Stroke Father     Cancer Father      Pancreas    Irritable Bowel Syndrome Father     Liver Cancer Brother     Liver Cancer Brother        Social History     Social History    Marital status:      Spouse name: Celia Coe Number of children: 2    Years of education: 12     Occupational History    RN [employer]     Social History Main Topics    Smoking status: Never Smoker    Smokeless tobacco: Never Used    Alcohol use No    Drug use: No    Sexual activity: Yes     Partners: Male     Other Topics Concern    None     Social History Narrative    None       Current Outpatient Prescriptions   Medication Sig Dispense Refill    ibuprofen (ADVIL;MOTRIN) 400 MG tablet Take 400 mg by mouth every 6 hours as needed for Pain      anastrozole (ARIMIDEX) 1 MG tablet TAKE ONE TABLET BY MOUTH DAILY 30 tablet 0    alendronate (FOSAMAX) 70 MG tablet TAKE 1 TABLET BY MOUTH ONCE WEEKLY BEFORE BREAKFAST, ON AN EMPTY STOMACH: REMAIN UPRIGHT FOR 30 MINUTES:TAKE WITH 8 OUNCES OF WATER 4 tablet 1    simvastatin (ZOCOR) 40 MG tablet TAKE ONE TABLET BY MOUTH EVERY NIGHT AT BEDTIME 30 tablet 3    ketoconazole (NIZORAL) 2 % shampoo Apply topically daily as needed for Itching Apply topically daily as needed.  clobetasol (TEMOVATE) 0.05 % external solution Apply topically 2 times daily as needed Apply topically 2 times daily.  CALCIUM CARBONATE PO Take by mouth daily      Omega-3 Fatty Acids (FISH OIL) 1000 MG CAPS Take 3,000 mg by mouth 3 times daily      fluticasone (FLONASE) 50 MCG/ACT nasal spray 2 sprays to each nostril once daily.  1 Bottle 2    Ascorbic Acid (VITAMIN C) 1000 MG tablet Take 1,000 mg by mouth daily      CO ENZYME Q-10 PO Take 1 tablet by mouth daily      aspirin 81 MG EC tablet Take 1 tablet by mouth daily LAST DOSE 7/20/16 30 tablet 0    Probiotic Product (PROBIOTIC DAILY PO) Take 1 tablet by mouth      omeprazole (PRILOSEC OTC) 20 MG tablet Take 1 tablet by mouth daily 30 tablet 3    cetirizine (ZYRTEC ALLERGY) 10 MG tablet Take 10 mg by mouth daily as needed        No current facility-administered medications for this visit. Allergies   Allergen Reactions    Sulfamethoxazole-Trimethoprim Rash       Review of Systems   HENT: Negative. Eyes: Negative. Respiratory: Negative. Cardiovascular: Negative. Gastrointestinal: Negative. Endocrine: Negative. Genitourinary: Negative. Musculoskeletal: Positive for myalgias. Allergic/Immunologic: Negative. Neurological: Positive for weakness and numbness. Hematological: Negative. Psychiatric/Behavioral: Negative. Objective:   Physical Exam    Vitals:    12/18/17 1610   BP: 138/84   Pulse: 84     weight: 139 lb 6.4 oz (63.2 kg)    Neurological Examination  Bilateral Tinels' at wrists  Constitutional .General exam well groomed   Ears /Nose/Throat: external ear . Normal exam  Neck and thyroid . Normal size  Respiratory . Breathsounds clear bilaterally  Cardiovascular: Auscultation of heart with regular rate and rhythm and normal heart sounds  Musculoskeletal. Muscle bulk and tone normal                                                           Muscle strength 5/5 strength throughout                                                                                 No dysmetria or dysdiadokinesis  No tremor   Normal fine motor  Orientation Alert and oriented x 3   Language process and speech normal . No aphasia   Cranial nerve 2 normal acuety and visual fields  Cranial nerve 3, 4 and 6 . Extraocular muscles are intact . Pupils are equal and reactive   Cranial nerve 5 . Normal strength of masseter and temporalis . Intact corneal reflex. Normal facial sensation  Cranial nerve 7 normal exam   Cranial nerve 8. Grossly intact hearing   Cranial nerve 9 and 10.  Symmetric palate

## 2018-01-02 DIAGNOSIS — D05.11 DUCTAL CARCINOMA IN SITU (DCIS) OF RIGHT BREAST: Primary | ICD-10-CM

## 2018-01-08 RX ORDER — ANASTROZOLE 1 MG/1
TABLET ORAL
Qty: 30 TABLET | Refills: 3 | Status: SHIPPED | OUTPATIENT
Start: 2018-01-08 | End: 2018-03-14 | Stop reason: SDUPTHER

## 2018-01-12 ENCOUNTER — OFFICE VISIT (OUTPATIENT)
Dept: INTERNAL MEDICINE | Age: 63
End: 2018-01-12
Payer: COMMERCIAL

## 2018-01-12 VITALS
DIASTOLIC BLOOD PRESSURE: 92 MMHG | WEIGHT: 140.2 LBS | OXYGEN SATURATION: 97 % | RESPIRATION RATE: 12 BRPM | BODY MASS INDEX: 28.26 KG/M2 | HEIGHT: 59 IN | SYSTOLIC BLOOD PRESSURE: 158 MMHG | HEART RATE: 64 BPM

## 2018-01-12 DIAGNOSIS — E78.00 HYPERCHOLESTEREMIA: ICD-10-CM

## 2018-01-12 DIAGNOSIS — G56.03 BILATERAL CARPAL TUNNEL SYNDROME: Primary | ICD-10-CM

## 2018-01-12 PROCEDURE — 99214 OFFICE O/P EST MOD 30 MIN: CPT | Performed by: INTERNAL MEDICINE

## 2018-01-12 ASSESSMENT — ENCOUNTER SYMPTOMS
GASTROINTESTINAL NEGATIVE: 1
EYES NEGATIVE: 1
RESPIRATORY NEGATIVE: 1
ALLERGIC/IMMUNOLOGIC NEGATIVE: 1

## 2018-01-12 NOTE — PROGRESS NOTES
alendronate (FOSAMAX) 70 MG tablet TAKE 1 TABLET BY MOUTH ONCE WEEKLY BEFORE BREAKFAST, ON AN EMPTY STOMACH: REMAIN UPRIGHT FOR 30 MINUTES:TAKE WITH 8 OUNCES OF WATER 4 tablet 1    simvastatin (ZOCOR) 40 MG tablet TAKE ONE TABLET BY MOUTH EVERY NIGHT AT BEDTIME 30 tablet 3    ketoconazole (NIZORAL) 2 % shampoo Apply topically daily as needed for Itching Apply topically daily as needed.  clobetasol (TEMOVATE) 0.05 % external solution Apply topically 2 times daily as needed Apply topically 2 times daily.  CALCIUM CARBONATE PO Take by mouth daily      Omega-3 Fatty Acids (FISH OIL) 1000 MG CAPS Take 3,000 mg by mouth 3 times daily      fluticasone (FLONASE) 50 MCG/ACT nasal spray 2 sprays to each nostril once daily. 1 Bottle 2    Ascorbic Acid (VITAMIN C) 1000 MG tablet Take 1,000 mg by mouth daily      CO ENZYME Q-10 PO Take 1 tablet by mouth daily      aspirin 81 MG EC tablet Take 1 tablet by mouth daily LAST DOSE 7/20/16 30 tablet 0    Probiotic Product (PROBIOTIC DAILY PO) Take 1 tablet by mouth      omeprazole (PRILOSEC OTC) 20 MG tablet Take 1 tablet by mouth daily 30 tablet 3    cetirizine (ZYRTEC ALLERGY) 10 MG tablet Take 10 mg by mouth daily as needed        No current facility-administered medications for this visit. Allergies   Allergen Reactions    Sulfamethoxazole-Trimethoprim Rash       Health Maintenance   Topic Date Due    Zostavax vaccine  07/05/2017    A1C test (Diabetic or Prediabetic)  07/03/2018    Breast cancer screen  09/07/2018    Cervical cancer screen  11/15/2019    Lipid screen  10/12/2022    Colon cancer screen colonoscopy  10/13/2024    DTaP/Tdap/Td vaccine (3 - Td) 10/04/2027    Flu vaccine  Completed    Hepatitis C screen  Addressed    HIV screen  Addressed       Controlled Substances Monitoring:      HPI:     Hyperlipidemia   This is a chronic (diagnosed with CTS and DJD) problem. The current episode started more than 1 year ago.  The problem is controlled. Recent lipid tests were reviewed and are normal. There are no known factors aggravating her hyperlipidemia. Current antihyperlipidemic treatment includes statins. The current treatment provides significant improvement of lipids. There are no compliance problems. There are no known risk factors for coronary artery disease. Hand Pain    The incident occurred more than 1 week ago (several months). There was no injury mechanism. The pain is present in the left hand and right hand. The quality of the pain is described as burning. The pain does not radiate. The pain is moderate. She has tried heat and NSAIDs (requesting referral to Dr. Austin Salinas for surgery evaluation) for the symptoms. The treatment provided mild relief. ROS:     Review of Systems   Constitutional: Negative. HENT: Negative. Eyes: Negative. Respiratory: Negative. Cardiovascular: Negative. Gastrointestinal: Negative. Endocrine: Negative. Genitourinary: Negative. Musculoskeletal: Positive for arthralgias. Skin: Negative. Allergic/Immunologic: Negative. Neurological: Negative. Hematological: Negative. Psychiatric/Behavioral: Negative. All other systems reviewed and are negative. Objective:     Physical Exam   Constitutional: She is oriented to person, place, and time. She appears well-developed and well-nourished. HENT:   Head: Normocephalic and atraumatic. Neck: Neck supple. Cardiovascular: Normal rate and regular rhythm. Pulmonary/Chest: Effort normal and breath sounds normal.   Abdominal: Bowel sounds are normal.   Musculoskeletal: She exhibits no edema. Neurological: She is alert and oriented to person, place, and time. Skin: Skin is warm and dry. Psychiatric: She has a normal mood and affect.  Her behavior is normal.        Visit Information    Have you changed or started any medications since your last visit including any over-the-counter medicines, vitamins, or herbal medicines? no   Are you having any side effects from any of your medications? -  no  Have you stopped taking any of your medications? Is so, why? -  no    Have you seen any other physician or provider since your last visit? Yes - Records Obtained  Have you had any other diagnostic tests since your last visit? Yes - Records Obtained  Have you been seen in the emergency room and/or had an admission to a hospital since we last saw you? No  Have you had your routine dental cleaning in the past 6 months? yes -     Have you activated your [employer] account? If not, what are your barriers?  Yes     Patient Care Team:  Yony Sotelo MD as PCP - General (Internal Medicine)  Christal Croft MD as Consulting Physician (Hematology and Oncology)  Indu Terrell MD as Obstetrician (Gynecologic Oncology)  Morales Sainz MD as Surgeon (Radiation Oncology)    Medical History Review  Past Medical, Family, and Social History reviewed and does contribute to the patient presenting condition    Health Maintenance   Topic Date Due    Zostavax vaccine  07/05/2017    A1C test (Diabetic or Prediabetic)  07/03/2018    Breast cancer screen  09/07/2018    Cervical cancer screen  11/15/2019    Lipid screen  10/12/2022    Colon cancer screen colonoscopy  10/13/2024    DTaP/Tdap/Td vaccine (3 - Td) 10/04/2027    Flu vaccine  Completed    Hepatitis C screen  Addressed    HIV screen  Addressed

## 2018-01-16 NOTE — DISCHARGE SUMMARY
[x] [facility]       Occupational Therapy             1st floor       [address], [place]         Phone: [phone]       Fax: [phone] [] [address] [facility] at            [address] [facility] , [address] [place] [address]     Phone: [phone]     Fax: [phone]         Occupational Therapy Discharge Note    Date: 2018      Patient: Kelsey Grimaldo  : 1955  MRN: [id number]    Physician: Ran Lao MD                     Insurance: May Katia      Medical Diagnosis: Psoriatic arthritis  L40.50, Bilateral osteoarthritis M19.041/M19.042. Rehab Codes: Edema, localized R60.1; Pain in right/left hands M79.641/M79.645; pain in right/left fingers M79.644/M79.645;Muscle wasting/atrophy bilat mult sites M62.591/M62.592.     Onset Date: 2017                      Next Dr. Edwin Douglass: 12/15/17  Initial Visit: 10/17/17  Final Visit: 17  Total Visits:  11       Notes from Progress Note 17:   Subjective:  Chief Complaint: Unable to fully close hands, making work and cooking tasks difficult. Pain: Intensity:  Right 4/10, Left 3/10 Location: bilateral hands  Pain Type: [x] Constant [] Intermittent   [  ] with pain meds at rest   [] With movement/Resistive activity [] With Sedentary activity                Pain Altered Tx: []Yes [x]No  Action Taken: NA     Objective:  Tests/Measurements:  Current Functional Level: 48% functionally impaired as measured with the DASH Functional Survey. 0-100 scale, with 0 = no Deficits  Previous Functional Level: 73% functionally impaired as measured with the DASH Functional Survey. Initial Functional Level:  75% functionally impaired as measured with the DASH  A decrease of 15 or more %points is considered to be a significant improvement in functional abilities and symptoms.  This individual has a  25% point decrease from the previous measurement.        AROM,DIGITS:  Current 17                         Extension/Flexion  Right DIP WFL/34 WFL/48 WFL/52 WFL/62 (WNL)               Initially no finger pads touching palm, following gentle PROM all finger pads touching palm. Middle and ring fingers are triggering with active composite flexion.     DIGITS:  Initial 10/17/17                            Extension/Flexion  Left       AROM INDEX MIDDLE RING LITTLE   MCP WFL/60 WFL/65 WFL/55 WFL/47   PIP WFL/75 WFL/85 WFL/92 WFL/75   DIP WFL52 WFL/64 WFL/50 WFL/65               Initially no finger pads touching palm, following gentle PROM all finger pads touching palm. Middle finger triggered once with active composite flexion.     STRENGTH:  Current 12/12/17            RIGHT LEFT    30.3 28.3   Lateral pinch 13 11   2 point pinch 10   9   3 jaw pinch   8   9      STRENGTH:  Comparison Previous 11/03/17              RIGHT LEFT    25 31.6   Lateral pinch 15 10   2 point pinch   9   8  With pain   3 jaw pinch 10   7  With pain      STRENGTH:      Comparison  Initial 10/17/17                RIGHT LEFT    21.3 27.3   Lateral pinch 13 12   2 point pinch   9   8  with pain   3 jaw pinch 10   8  with pain      Edema: Generalized bilateral hand and finger edema unchanged by observation and pt report, hand edema indicative of psoriatic arthritis.     Sensation:  Pt reports diminished sensation in bilateral middle and ring ringers proximally to distal palmar crease (newly reported this date).       Problems:                                [x] Pain                                          [x] ROM                             [x] Strength  [x] Function                                   [] Adherent Scar                          [x] Edema                                      [] Open Wound                [] Coordination                 [] Sensation                                 [] Falls: History/Risk of    []                                                        Short Term Goals: ( 10   Treatments)  1.  Decrease Pain: Will have 0/10 pain at rest

## 2018-02-02 DIAGNOSIS — D05.11 INTRADUCTAL CARCINOMA IN SITU OF RIGHT BREAST: Primary | ICD-10-CM

## 2018-02-02 RX ORDER — ALENDRONATE SODIUM 70 MG/1
TABLET ORAL
Qty: 4 TABLET | Refills: 1 | Status: SHIPPED | OUTPATIENT
Start: 2018-02-02 | End: 2018-03-14 | Stop reason: SDUPTHER

## 2018-02-02 NOTE — TELEPHONE ENCOUNTER
Received prescription refill request from [facility] pharmacy for fosamax  Last refill was 11/27/17 with 1 refill.    Will route to physician for approval.

## 2018-03-05 ENCOUNTER — HOSPITAL ENCOUNTER (OUTPATIENT)
Dept: MAMMOGRAPHY | Age: 63
Discharge: HOME | End: 2018-03-07
Payer: COMMERCIAL

## 2018-03-05 DIAGNOSIS — D05.11 DUCTAL CARCINOMA IN SITU (DCIS) OF RIGHT BREAST: ICD-10-CM

## 2018-03-05 PROCEDURE — 77065 DX MAMMO INCL CAD UNI: CPT

## 2018-03-13 ENCOUNTER — HOSPITAL ENCOUNTER (OUTPATIENT)
Facility: MEDICAL CENTER | Age: 63
End: 2018-03-13
Payer: COMMERCIAL

## 2018-03-14 ENCOUNTER — TELEPHONE (OUTPATIENT)
Dept: ONCOLOGY | Age: 63
End: 2018-03-14

## 2018-03-14 ENCOUNTER — OFFICE VISIT (OUTPATIENT)
Dept: ONCOLOGY | Age: 63
End: 2018-03-14
Payer: COMMERCIAL

## 2018-03-14 VITALS
RESPIRATION RATE: 18 BRPM | BODY MASS INDEX: 28.34 KG/M2 | TEMPERATURE: 98.3 F | HEART RATE: 66 BPM | SYSTOLIC BLOOD PRESSURE: 134 MMHG | WEIGHT: 140.3 LBS | DIASTOLIC BLOOD PRESSURE: 84 MMHG

## 2018-03-14 DIAGNOSIS — D05.11 INTRADUCTAL CARCINOMA IN SITU OF RIGHT BREAST: ICD-10-CM

## 2018-03-14 DIAGNOSIS — Z12.31 SCREENING MAMMOGRAM, ENCOUNTER FOR: ICD-10-CM

## 2018-03-14 DIAGNOSIS — D05.11 DUCTAL CARCINOMA IN SITU (DCIS) OF RIGHT BREAST: Primary | ICD-10-CM

## 2018-03-14 PROCEDURE — 99211 OFF/OP EST MAY X REQ PHY/QHP: CPT | Performed by: INTERNAL MEDICINE

## 2018-03-14 PROCEDURE — 99214 OFFICE O/P EST MOD 30 MIN: CPT | Performed by: INTERNAL MEDICINE

## 2018-03-14 RX ORDER — ALENDRONATE SODIUM 70 MG/1
TABLET ORAL
Qty: 12 TABLET | Refills: 2 | Status: SHIPPED | OUTPATIENT
Start: 2018-03-14 | End: 2018-04-09 | Stop reason: SDUPTHER

## 2018-03-14 RX ORDER — ANASTROZOLE 1 MG/1
TABLET ORAL
Qty: 90 TABLET | Refills: 3 | Status: SHIPPED | OUTPATIENT
Start: 2018-03-14 | End: 2019-04-01 | Stop reason: SDUPTHER

## 2018-03-29 ENCOUNTER — TELEPHONE (OUTPATIENT)
Dept: ONCOLOGY | Age: 63
End: 2018-03-29

## 2018-04-02 ENCOUNTER — TELEPHONE (OUTPATIENT)
Dept: ONCOLOGY | Age: 63
End: 2018-04-02

## 2018-04-09 DIAGNOSIS — D05.11 DUCTAL CARCINOMA IN SITU (DCIS) OF RIGHT BREAST: Primary | ICD-10-CM

## 2018-04-13 RX ORDER — ALENDRONATE SODIUM 70 MG/1
TABLET ORAL
Qty: 12 TABLET | Refills: 2 | Status: SHIPPED | OUTPATIENT
Start: 2018-04-13 | End: 2018-12-11 | Stop reason: SDUPTHER

## 2018-05-08 DIAGNOSIS — M25.561 RIGHT KNEE PAIN, UNSPECIFIED CHRONICITY: Primary | ICD-10-CM

## 2018-05-10 ENCOUNTER — OFFICE VISIT (OUTPATIENT)
Dept: ORTHOPEDIC SURGERY | Age: 63
End: 2018-05-10
Payer: COMMERCIAL

## 2018-05-10 VITALS
SYSTOLIC BLOOD PRESSURE: 118 MMHG | WEIGHT: 141 LBS | DIASTOLIC BLOOD PRESSURE: 75 MMHG | BODY MASS INDEX: 28.43 KG/M2 | HEIGHT: 59 IN

## 2018-05-10 DIAGNOSIS — M17.11 ARTHRITIS OF RIGHT KNEE: Primary | ICD-10-CM

## 2018-05-10 PROCEDURE — 99203 OFFICE O/P NEW LOW 30 MIN: CPT | Performed by: ORTHOPAEDIC SURGERY

## 2018-05-10 PROCEDURE — 20610 DRAIN/INJ JOINT/BURSA W/O US: CPT | Performed by: ORTHOPAEDIC SURGERY

## 2018-05-10 RX ORDER — BUPIVACAINE HYDROCHLORIDE 2.5 MG/ML
30 INJECTION, SOLUTION INFILTRATION; PERINEURAL ONCE
Status: DISCONTINUED | OUTPATIENT
Start: 2018-05-10 | End: 2018-05-11

## 2018-05-10 RX ORDER — METHYLPREDNISOLONE ACETATE 80 MG/ML
80 INJECTION, SUSPENSION INTRA-ARTICULAR; INTRALESIONAL; INTRAMUSCULAR; SOFT TISSUE ONCE
Status: COMPLETED | OUTPATIENT
Start: 2018-05-10 | End: 2018-05-11

## 2018-05-10 ASSESSMENT — ENCOUNTER SYMPTOMS
SHORTNESS OF BREATH: 0
WHEEZING: 0
BACK PAIN: 0

## 2018-05-11 RX ORDER — BUPIVACAINE HYDROCHLORIDE 2.5 MG/ML
2 INJECTION, SOLUTION INFILTRATION; PERINEURAL ONCE
Status: COMPLETED | OUTPATIENT
Start: 2018-05-11 | End: 2018-05-11

## 2018-05-11 RX ADMIN — BUPIVACAINE HYDROCHLORIDE 5 MG: 2.5 INJECTION, SOLUTION INFILTRATION; PERINEURAL at 13:08

## 2018-05-11 RX ADMIN — METHYLPREDNISOLONE ACETATE 80 MG: 80 INJECTION, SUSPENSION INTRA-ARTICULAR; INTRALESIONAL; INTRAMUSCULAR; SOFT TISSUE at 13:07

## 2018-05-17 ENCOUNTER — HOSPITAL ENCOUNTER (OUTPATIENT)
Dept: RADIATION ONCOLOGY | Facility: MEDICAL CENTER | Age: 63
Discharge: HOME | End: 2018-05-17
Payer: COMMERCIAL

## 2018-05-17 PROCEDURE — 99211 OFF/OP EST MAY X REQ PHY/QHP: CPT | Performed by: RADIOLOGY

## 2018-09-07 ENCOUNTER — HOSPITAL ENCOUNTER (OUTPATIENT)
Dept: MAMMOGRAPHY | Age: 63
Discharge: HOME | End: 2018-09-09
Payer: COMMERCIAL

## 2018-09-07 DIAGNOSIS — Z12.31 SCREENING MAMMOGRAM, ENCOUNTER FOR: ICD-10-CM

## 2018-09-07 PROCEDURE — 77063 BREAST TOMOSYNTHESIS BI: CPT

## 2018-09-12 ENCOUNTER — HOSPITAL ENCOUNTER (OUTPATIENT)
Facility: MEDICAL CENTER | Age: 63
End: 2018-09-12
Payer: COMMERCIAL

## 2018-09-19 ENCOUNTER — OFFICE VISIT (OUTPATIENT)
Dept: ONCOLOGY | Age: 63
End: 2018-09-19
Payer: COMMERCIAL

## 2018-09-19 ENCOUNTER — TELEPHONE (OUTPATIENT)
Dept: ONCOLOGY | Age: 63
End: 2018-09-19

## 2018-09-19 VITALS
TEMPERATURE: 98.4 F | BODY MASS INDEX: 27.99 KG/M2 | RESPIRATION RATE: 18 BRPM | SYSTOLIC BLOOD PRESSURE: 148 MMHG | HEART RATE: 65 BPM | WEIGHT: 138.6 LBS | DIASTOLIC BLOOD PRESSURE: 91 MMHG

## 2018-09-19 DIAGNOSIS — K21.9 GASTROESOPHAGEAL REFLUX DISEASE WITHOUT ESOPHAGITIS: Primary | ICD-10-CM

## 2018-09-19 PROCEDURE — 99211 OFF/OP EST MAY X REQ PHY/QHP: CPT | Performed by: INTERNAL MEDICINE

## 2018-09-19 PROCEDURE — 99214 OFFICE O/P EST MOD 30 MIN: CPT | Performed by: INTERNAL MEDICINE

## 2018-09-19 NOTE — PROGRESS NOTES
Medication Sig Dispense Refill    alendronate (FOSAMAX) 70 MG tablet TAKE 1 TABLET BY MOUTH ONCE WEEKLY BEFORE BREAKFAST, ON AN EMPTY STOMACH: REMAIN UPRIGHT FOR 30 MINUTES:TAKE WITH 8 OUNCES OF WATER 12 tablet 2    anastrozole (ARIMIDEX) 1 MG tablet TAKE ONE TABLET BY MOUTH DAILY 90 tablet 3    ibuprofen (ADVIL;MOTRIN) 400 MG tablet Take 400 mg by mouth every 6 hours as needed for Pain      simvastatin (ZOCOR) 40 MG tablet TAKE ONE TABLET BY MOUTH EVERY NIGHT AT BEDTIME 30 tablet 3    ketoconazole (NIZORAL) 2 % shampoo Apply topically daily as needed for Itching Apply topically daily as needed.  clobetasol (TEMOVATE) 0.05 % external solution Apply topically 2 times daily as needed Apply topically 2 times daily.  CALCIUM CARBONATE PO Take by mouth daily      Omega-3 Fatty Acids (FISH OIL) 1000 MG CAPS Take 3,000 mg by mouth 3 times daily      fluticasone (FLONASE) 50 MCG/ACT nasal spray 2 sprays to each nostril once daily. 1 Bottle 2    Ascorbic Acid (VITAMIN C) 1000 MG tablet Take 1,000 mg by mouth daily      CO ENZYME Q-10 PO Take 1 tablet by mouth daily      aspirin 81 MG EC tablet Take 1 tablet by mouth daily LAST DOSE 7/20/16 30 tablet 0    Probiotic Product (PROBIOTIC DAILY PO) Take 1 tablet by mouth      omeprazole (PRILOSEC OTC) 20 MG tablet Take 1 tablet by mouth daily (Patient taking differently: Take 20 mg by mouth as needed ) 30 tablet 3    cetirizine (ZYRTEC ALLERGY) 10 MG tablet Take 10 mg by mouth daily as needed        No current facility-administered medications for this visit. REVIEW OF SYSTEM:   Constitutional: No fever or chills.  No night sweats, no weight loss   Eyes: No eye discharge, double vision, or eye pain   HEENT: negative for sore mouth, sore throat, hoarseness and voice change   Respiratory: negative for cough , sputum, dyspnea, wheezing, hemoptysis, chest pain   Cardiovascular: negative for chest pain, dyspnea, palpitations, orthopnea, PND Gastrointestinal: negative for nausea, vomiting, diarrhea, constipation, abdominal pain, Dysphagia, hematemesis and hematochezia   Genitourinary: negative for frequency, dysuria, nocturia, urinary incontinence, and hematuria   Integument: negative for rash, skin lesions, bruises. Hematologic/Lymphatic: negative for easy bruising, bleeding, lymphadenopathy, petechiae and swelling/edema   Endocrine: negative for heat or cold intolerance, tremor, weight changes, change in bowel habits and hair loss   Musculoskeletal: negative for myalgias, arthralgias, pain, joint swelling,and bone pain   Neurological: negative for headaches, dizziness, seizures, weakness, numbness  OBJECTIVE:         Vitals:    09/19/18 0827   BP: (!) 148/91   Pulse: 65   Resp: 18   Temp: 98.4 °F (36.9 °C)      Vital signs were reviewed.     PHYSICAL EXAM:   General appearance - well appearing, no in pain or distress   Mental status - alert and cooperative   Eyes - pupils equal and reactive, extraocular eye movements intact   Mouth - mucous membranes moist, pharynx normal without lesions   Neck - supple, no significant adenopathy   Lymphatics - no palpable lymphadenopathy, no hepatosplenomegaly   Chest - clear to auscultation, no wheezes, rales or rhonchi, symmetric air entry  Status post lumpectomy right side, surgical margin healing well   Heart - normal rate, regular rhythm, normal S1, S2, no murmurs, rubs, clicks or gallops   Abdomen - soft, nontender, nondistended, no masses or organomegaly   Neurological - alert, oriented, normal speech, no focal findings or movement disorder noted   Musculoskeletal - no joint tenderness, deformity or swelling   Extremities - peripheral pulses normal, no pedal edema, no clubbing or cyanosis   Skin - normal coloration and turgor, no rashes, no suspicious skin lesions noted   LABORATORY DATA:     Lab Results   Component Value Date    WBC 5.2 10/09/2017    HGB 12.3 10/09/2017    HCT 36.5 10/09/2017    MCV 90.6 DATA:    Bilateral screening mammogram 9/7/18  Impression   Benign findings.  No mammographic evidence of malignancy.       RECOMMENDATION:       Annual screening mammogram.       BIRADS:   BIRADS - CATEGORY 2       Benign, no evidence of malignancy.  Normal interval follow-up is recommended   in 12 months. ASSESSMENT:    Ms Lauren Carrion is a 65-year-old female with newly diagnosed, right-sided DCIS, ER positive. She had lumpectomy on 8/2/16. Her radiation therapy was started on 11/20 and completed in jan 2017.  Now on Arimidex and tolerating well. I discussed the results of recent DEXA scan which showed osteopenia. Given that she is at risk for osteoporosis I suggested taking Fosamax weekly and also taking calcium and vitamin D. She will need annual screening mammograms. She reports epigastric pain and GERD symptoms and we will refer her to gastroenterology. During today's visit, the patient and the family had a number of reasonable questions which were answered to their satisfaction. They verbalized understanding of the information provided and they agreed to proceed as outlined above.       PLAN:   Continue arimidex  Recent mammogram showed no mammographic evidence of malignancy  She has history of GERD/peptic ulcer disease and we'll refer her to gastroenterologist  Return to clinic in 6 months    I spent more than 25 minutes examining, evaluating, reviewing data and counseling the patient. Greater than 50% of that time was spent face-to-face with the patient in counseling and coordinating her care.     Avi Strong MD  Hematology/Oncology    C;    Dolly Lassiter MD

## 2018-10-04 ENCOUNTER — HOSPITAL ENCOUNTER (OUTPATIENT)
Age: 63
Setting detail: SPECIMEN
Discharge: HOME | End: 2018-10-04
Payer: COMMERCIAL

## 2018-10-10 LAB — SURGICAL PATHOLOGY REPORT: NORMAL

## 2018-11-19 ENCOUNTER — HOSPITAL ENCOUNTER (OUTPATIENT)
Dept: OCCUPATIONAL THERAPY | Age: 63
Setting detail: THERAPIES SERIES
Discharge: HOME | End: 2018-11-19
Payer: COMMERCIAL

## 2018-11-19 PROCEDURE — 97165 OT EVAL LOW COMPLEX 30 MIN: CPT

## 2018-11-19 PROCEDURE — 97110 THERAPEUTIC EXERCISES: CPT

## 2018-11-19 PROCEDURE — 97140 MANUAL THERAPY 1/> REGIONS: CPT

## 2018-11-21 ENCOUNTER — HOSPITAL ENCOUNTER (OUTPATIENT)
Dept: OCCUPATIONAL THERAPY | Age: 63
Setting detail: THERAPIES SERIES
Discharge: HOME | End: 2018-11-21
Payer: COMMERCIAL

## 2018-11-21 PROCEDURE — 97110 THERAPEUTIC EXERCISES: CPT

## 2018-11-21 PROCEDURE — 97140 MANUAL THERAPY 1/> REGIONS: CPT

## 2018-11-24 ENCOUNTER — APPOINTMENT (OUTPATIENT)
Dept: GENERAL RADIOLOGY | Age: 63
DRG: 313 | End: 2018-11-24
Payer: COMMERCIAL

## 2018-11-24 ENCOUNTER — HOSPITAL ENCOUNTER (INPATIENT)
Age: 63
LOS: 2 days | Discharge: HOME | DRG: 313 | End: 2018-11-26
Attending: EMERGENCY MEDICINE | Admitting: INTERNAL MEDICINE
Payer: COMMERCIAL

## 2018-11-24 DIAGNOSIS — I20.8 OTHER FORMS OF ANGINA PECTORIS (HCC): Primary | ICD-10-CM

## 2018-11-24 PROBLEM — R07.89 ATYPICAL CHEST PAIN: Status: ACTIVE | Noted: 2018-11-24

## 2018-11-24 PROBLEM — R07.9 CHEST PAIN: Status: ACTIVE | Noted: 2018-11-24

## 2018-11-24 LAB
-: ABNORMAL
ABSOLUTE EOS #: 0.1 K/UL (ref 0–0.4)
ABSOLUTE IMMATURE GRANULOCYTE: ABNORMAL K/UL (ref 0–0.3)
ABSOLUTE LYMPH #: 1.7 K/UL (ref 1–4.8)
ABSOLUTE MONO #: 0.4 K/UL (ref 0.2–0.8)
AMORPHOUS: ABNORMAL
ANION GAP SERPL CALCULATED.3IONS-SCNC: 12 MMOL/L (ref 9–17)
BACTERIA: ABNORMAL
BASOPHILS # BLD: 0 % (ref 0–2)
BASOPHILS ABSOLUTE: 0 K/UL (ref 0–0.2)
BILIRUBIN URINE: NEGATIVE
BUN BLDV-MCNC: 20 MG/DL (ref 8–23)
BUN/CREAT BLD: 38 (ref 9–20)
CALCIUM SERPL-MCNC: 9.2 MG/DL (ref 8.6–10.4)
CASTS UA: ABNORMAL /LPF
CHLORIDE BLD-SCNC: 103 MMOL/L (ref 98–107)
CO2: 27 MMOL/L (ref 20–31)
COLOR: YELLOW
COMMENT UA: ABNORMAL
CREAT SERPL-MCNC: 0.53 MG/DL (ref 0.5–0.9)
CRYSTALS, UA: ABNORMAL /HPF
D-DIMER QUANTITATIVE: <0.17 MG/L FEU
DIFFERENTIAL TYPE: ABNORMAL
EKG ATRIAL RATE: 75 BPM
EKG P AXIS: 56 DEGREES
EKG P-R INTERVAL: 164 MS
EKG Q-T INTERVAL: 402 MS
EKG QRS DURATION: 84 MS
EKG QTC CALCULATION (BAZETT): 448 MS
EKG R AXIS: 55 DEGREES
EKG T AXIS: 47 DEGREES
EKG VENTRICULAR RATE: 75 BPM
EOSINOPHILS RELATIVE PERCENT: 1 % (ref 1–4)
EPITHELIAL CELLS UA: ABNORMAL /HPF (ref 0–5)
GFR AFRICAN AMERICAN: >60 ML/MIN
GFR NON-AFRICAN AMERICAN: >60 ML/MIN
GFR SERPL CREATININE-BSD FRML MDRD: ABNORMAL ML/MIN/{1.73_M2}
GFR SERPL CREATININE-BSD FRML MDRD: ABNORMAL ML/MIN/{1.73_M2}
GLUCOSE BLD-MCNC: 141 MG/DL (ref 70–99)
GLUCOSE URINE: NEGATIVE
HCT VFR BLD CALC: 34.6 % (ref 36–46)
HEMOGLOBIN: 11.9 G/DL (ref 12–16)
IMMATURE GRANULOCYTES: ABNORMAL %
KETONES, URINE: ABNORMAL
LEUKOCYTE ESTERASE, URINE: ABNORMAL
LYMPHOCYTES # BLD: 28 % (ref 24–44)
MCH RBC QN AUTO: 30.9 PG (ref 26–34)
MCHC RBC AUTO-ENTMCNC: 34.4 G/DL (ref 31–37)
MCV RBC AUTO: 90 FL (ref 80–100)
MONOCYTES # BLD: 6 % (ref 1–7)
MUCUS: ABNORMAL
NITRITE, URINE: NEGATIVE
NRBC AUTOMATED: ABNORMAL PER 100 WBC
OTHER OBSERVATIONS UA: ABNORMAL
PDW BLD-RTO: 13 % (ref 11.5–14.5)
PH UA: 6 (ref 5–8)
PLATELET # BLD: 256 K/UL (ref 130–400)
PLATELET ESTIMATE: ABNORMAL
PMV BLD AUTO: 7.5 FL (ref 6–12)
POTASSIUM SERPL-SCNC: 4.3 MMOL/L (ref 3.7–5.3)
PROTEIN UA: NEGATIVE
RBC # BLD: 3.85 M/UL (ref 4–5.2)
RBC # BLD: ABNORMAL 10*6/UL
RBC UA: ABNORMAL /HPF (ref 0–2)
RENAL EPITHELIAL, UA: ABNORMAL /HPF
SEG NEUTROPHILS: 65 % (ref 36–66)
SEGMENTED NEUTROPHILS ABSOLUTE COUNT: 4 K/UL (ref 1.8–7.7)
SODIUM BLD-SCNC: 142 MMOL/L (ref 135–144)
SPECIFIC GRAVITY UA: 1.02 (ref 1–1.03)
TRICHOMONAS: ABNORMAL
TROPONIN INTERP: NORMAL
TROPONIN INTERP: NORMAL
TROPONIN T: <0.03 NG/ML
TROPONIN T: <0.03 NG/ML
TURBIDITY: CLEAR
URINE HGB: NEGATIVE
UROBILINOGEN, URINE: NORMAL
WBC # BLD: 6.2 K/UL (ref 3.5–11)
WBC # BLD: ABNORMAL 10*3/UL
WBC UA: ABNORMAL /HPF (ref 0–5)
YEAST: ABNORMAL

## 2018-11-24 PROCEDURE — 81001 URINALYSIS AUTO W/SCOPE: CPT

## 2018-11-24 PROCEDURE — 6370000000 HC RX 637 (ALT 250 FOR IP): Performed by: EMERGENCY MEDICINE

## 2018-11-24 PROCEDURE — 99285 EMERGENCY DEPT VISIT HI MDM: CPT

## 2018-11-24 PROCEDURE — 36415 COLL VENOUS BLD VENIPUNCTURE: CPT

## 2018-11-24 PROCEDURE — 85379 FIBRIN DEGRADATION QUANT: CPT

## 2018-11-24 PROCEDURE — 93005 ELECTROCARDIOGRAM TRACING: CPT

## 2018-11-24 PROCEDURE — 84484 ASSAY OF TROPONIN QUANT: CPT

## 2018-11-24 PROCEDURE — 80048 BASIC METABOLIC PNL TOTAL CA: CPT

## 2018-11-24 PROCEDURE — 2060000000 HC ICU INTERMEDIATE R&B

## 2018-11-24 PROCEDURE — 2580000003 HC RX 258: Performed by: EMERGENCY MEDICINE

## 2018-11-24 PROCEDURE — 85025 COMPLETE CBC W/AUTO DIFF WBC: CPT

## 2018-11-24 PROCEDURE — 71045 X-RAY EXAM CHEST 1 VIEW: CPT

## 2018-11-24 RX ORDER — CHLORAL HYDRATE 500 MG
3000 CAPSULE ORAL 3 TIMES DAILY
Status: CANCELLED | OUTPATIENT
Start: 2018-11-24

## 2018-11-24 RX ORDER — ANASTROZOLE 1 MG/1
1 TABLET ORAL DAILY
Status: DISCONTINUED | OUTPATIENT
Start: 2018-11-24 | End: 2018-11-26 | Stop reason: HOSPADM

## 2018-11-24 RX ORDER — SODIUM CHLORIDE 9 MG/ML
INJECTION, SOLUTION INTRAVENOUS CONTINUOUS
Status: DISCONTINUED | OUTPATIENT
Start: 2018-11-24 | End: 2018-11-24

## 2018-11-24 RX ORDER — ASPIRIN 81 MG/1
81 TABLET ORAL DAILY
COMMUNITY

## 2018-11-24 RX ORDER — ASPIRIN 81 MG/1
324 TABLET, CHEWABLE ORAL ONCE
Status: COMPLETED | OUTPATIENT
Start: 2018-11-24 | End: 2018-11-24

## 2018-11-24 RX ORDER — CALCIUM CARBONATE 200(500)MG
500 TABLET,CHEWABLE ORAL DAILY
Status: DISCONTINUED | OUTPATIENT
Start: 2018-11-24 | End: 2018-11-26 | Stop reason: HOSPADM

## 2018-11-24 RX ORDER — NITROGLYCERIN 0.4 MG/1
0.4 TABLET SUBLINGUAL EVERY 5 MIN PRN
Status: DISCONTINUED | OUTPATIENT
Start: 2018-11-24 | End: 2018-11-26 | Stop reason: HOSPADM

## 2018-11-24 RX ORDER — ASPIRIN 81 MG/1
81 TABLET ORAL DAILY
Status: DISCONTINUED | OUTPATIENT
Start: 2018-11-24 | End: 2018-11-26 | Stop reason: HOSPADM

## 2018-11-24 RX ORDER — FLUTICASONE PROPIONATE 50 MCG
2 SPRAY, SUSPENSION (ML) NASAL DAILY
Status: CANCELLED | OUTPATIENT
Start: 2018-11-24

## 2018-11-24 RX ORDER — OMEPRAZOLE 20 MG/1
20 TABLET, DELAYED RELEASE ORAL DAILY PRN
COMMUNITY

## 2018-11-24 RX ORDER — ASCORBIC ACID 500 MG
1000 TABLET ORAL DAILY
Status: DISCONTINUED | OUTPATIENT
Start: 2018-11-24 | End: 2018-11-26 | Stop reason: HOSPADM

## 2018-11-24 RX ORDER — SIMVASTATIN 20 MG
20 TABLET ORAL DAILY
COMMUNITY

## 2018-11-24 RX ORDER — SIMVASTATIN 20 MG
20 TABLET ORAL
Status: DISCONTINUED | OUTPATIENT
Start: 2018-11-26 | End: 2018-11-26 | Stop reason: HOSPADM

## 2018-11-24 RX ORDER — ALENDRONATE SODIUM 70 MG/1
70 TABLET ORAL WEEKLY
Status: DISCONTINUED | OUTPATIENT
Start: 2018-11-24 | End: 2018-11-24 | Stop reason: CLARIF

## 2018-11-24 RX ORDER — CLOBETASOL PROPIONATE 0.46 MG/ML
SOLUTION TOPICAL 2 TIMES DAILY PRN
Status: DISCONTINUED | OUTPATIENT
Start: 2018-11-24 | End: 2018-11-25

## 2018-11-24 RX ORDER — PANTOPRAZOLE SODIUM 20 MG/1
20 TABLET, DELAYED RELEASE ORAL DAILY PRN
Status: DISCONTINUED | OUTPATIENT
Start: 2018-11-24 | End: 2018-11-26 | Stop reason: HOSPADM

## 2018-11-24 RX ADMIN — SODIUM CHLORIDE: 9 INJECTION, SOLUTION INTRAVENOUS at 15:11

## 2018-11-24 RX ADMIN — Medication 0.4 MG: at 14:31

## 2018-11-24 RX ADMIN — ASPIRIN 81 MG 243 MG: 81 TABLET ORAL at 15:59

## 2018-11-24 ASSESSMENT — ENCOUNTER SYMPTOMS
GASTROINTESTINAL NEGATIVE: 1
RESPIRATORY NEGATIVE: 1
ALLERGIC/IMMUNOLOGIC NEGATIVE: 1
EYES NEGATIVE: 1

## 2018-11-24 ASSESSMENT — PAIN DESCRIPTION - DESCRIPTORS: DESCRIPTORS: ACHING

## 2018-11-24 ASSESSMENT — PAIN DESCRIPTION - PAIN TYPE: TYPE: ACUTE PAIN

## 2018-11-24 ASSESSMENT — PAIN SCALES - GENERAL: PAINLEVEL_OUTOF10: 3

## 2018-11-24 NOTE — PLAN OF CARE
Problem: Pain:  Goal: Pain level will decrease  Pain level will decrease   Outcome: Met This Shift  Pt has no current complaints of pain. VSS.

## 2018-11-24 NOTE — H&P
spots  HEENT:  negative for  hearing loss, tinnitus, ear drainage, earaches and epistaxis  RESPIRATORY:  negative for  dry cough, cough with sputum, wheezing, hemoptysis and pleuritic pain  CARDIOVASCULAR:  negative for  palpitations, orthopnea, PND, exertional chest pressure/discomfort, syncope  GASTROINTESTINAL:  Hx of GERD.   GENITOURINARY:  negative for dysuria, nocturia and hematuria  INTEGUMENT/BREAST:  negative for rash, skin lesion(s), dryness and skin color change  HEMATOLOGIC/LYMPHATIC:  negative for easy bruising, bleeding and lymphadenopathy  ALLERGIC/IMMUNOLOGIC:  See list.  ENDOCRINE:  negative for heat intolerance, cold intolerance and tremor  MUSCULOSKELETAL:  negative for  arthralgias, pain, joint swelling and stiff joints  NEUROLOGICAL:  negative for dizziness, seizures, memory problems, speech problems, syncope and near syncope  BEHAVIOR/PSYCH:  negative for depressed mood    PHYSICAL EXAM:  Vitals:  /75   Pulse 79   Temp 98.3 °F (36.8 °C)   Resp 20   Ht 5' (1.524 m)   Wt 138 lb 1 oz (62.6 kg)   LMP 07/18/2009 (Exact Date)   SpO2 94%   BMI 26.96 kg/m²     General Appearance: alert and oriented to person, place and time, well-developed and well nourished and in no acute distress  Skin: warm and dry, no rash or erythema and no suspicious lesions noted  Head: normocephalic and atraumatic  Eyes: pupils equal, round, and reactive to light, extraocular eye movements intact, conjunctivae normal and sclera anicteric  ENT: oropharynx clear and moist with normal mucous membranes  Neck: neck supple and non tender without mass, no thyromegaly, no thyroid nodules and no cervical adenopathy   Pulmonary/Chest: clear to auscultation bilaterally- no wheezes, rales or rhonchi, normal air movement, no respiratory distress and no chest wall tenderness  Cardiovascular: normal rate, regular rhythm, normal S1 and S2, no gallops, intact distal pulses and no carotid bruits  Abdomen: soft, non-tender,

## 2018-11-25 LAB
TROPONIN INTERP: NORMAL
TROPONIN T: <0.03 NG/ML

## 2018-11-25 PROCEDURE — 2060000000 HC ICU INTERMEDIATE R&B

## 2018-11-25 PROCEDURE — 84484 ASSAY OF TROPONIN QUANT: CPT

## 2018-11-25 PROCEDURE — 36415 COLL VENOUS BLD VENIPUNCTURE: CPT

## 2018-11-25 PROCEDURE — 6370000000 HC RX 637 (ALT 250 FOR IP): Performed by: INTERNAL MEDICINE

## 2018-11-25 RX ADMIN — PROBIOTIC PRODUCT - TAB: TAB at 08:39

## 2018-11-25 RX ADMIN — ANTACID TABLETS 500 MG: 500 TABLET, CHEWABLE ORAL at 08:39

## 2018-11-25 RX ADMIN — ANASTROZOLE 1 MG: 1 TABLET ORAL at 08:39

## 2018-11-25 RX ADMIN — ASPIRIN 81 MG: 81 TABLET, COATED ORAL at 08:39

## 2018-11-25 RX ADMIN — OXYCODONE HYDROCHLORIDE AND ACETAMINOPHEN 1000 MG: 500 TABLET ORAL at 08:39

## 2018-11-25 ASSESSMENT — PAIN SCALES - GENERAL: PAINLEVEL_OUTOF10: 0

## 2018-11-25 NOTE — PLAN OF CARE
Problem: Pain:  Goal: Pain level will decrease  Pain level will decrease   Outcome: Ongoing  Patient denies any pain, patient states chest pain has subsided. Will continue to monitor patient for pain.   Goal: Control of acute pain  Control of acute pain   Outcome: Ongoing    Goal: Control of chronic pain  Control of chronic pain   Outcome: Ongoing

## 2018-11-26 ENCOUNTER — APPOINTMENT (OUTPATIENT)
Dept: NUCLEAR MEDICINE | Age: 63
DRG: 313 | End: 2018-11-26
Payer: COMMERCIAL

## 2018-11-26 ENCOUNTER — HOSPITAL ENCOUNTER (OUTPATIENT)
Dept: OCCUPATIONAL THERAPY | Age: 63
Setting detail: THERAPIES SERIES
Discharge: HOME | End: 2018-11-26
Payer: COMMERCIAL

## 2018-11-26 VITALS
HEIGHT: 60 IN | OXYGEN SATURATION: 97 % | BODY MASS INDEX: 27.1 KG/M2 | HEART RATE: 72 BPM | WEIGHT: 138.06 LBS | DIASTOLIC BLOOD PRESSURE: 85 MMHG | SYSTOLIC BLOOD PRESSURE: 126 MMHG | TEMPERATURE: 98.1 F | RESPIRATION RATE: 16 BRPM

## 2018-11-26 LAB
LV EF: 60 %
LV EF: 77 %
LVEF MODALITY: NORMAL
LVEF MODALITY: NORMAL

## 2018-11-26 PROCEDURE — A9500 TC99M SESTAMIBI: HCPCS | Performed by: INTERNAL MEDICINE

## 2018-11-26 PROCEDURE — 78452 HT MUSCLE IMAGE SPECT MULT: CPT

## 2018-11-26 PROCEDURE — 3430000000 HC RX DIAGNOSTIC RADIOPHARMACEUTICAL: Performed by: INTERNAL MEDICINE

## 2018-11-26 PROCEDURE — 93306 TTE W/DOPPLER COMPLETE: CPT

## 2018-11-26 PROCEDURE — 93017 CV STRESS TEST TRACING ONLY: CPT

## 2018-11-26 PROCEDURE — 6370000000 HC RX 637 (ALT 250 FOR IP): Performed by: INTERNAL MEDICINE

## 2018-11-26 RX ORDER — SODIUM CHLORIDE 0.9 % (FLUSH) 0.9 %
10 SYRINGE (ML) INJECTION PRN
Status: CANCELLED | OUTPATIENT
Start: 2018-11-26 | End: 2018-11-27

## 2018-11-26 RX ORDER — 0.9 % SODIUM CHLORIDE 0.9 %
250 INTRAVENOUS SOLUTION INTRAVENOUS ONCE
Status: CANCELLED | OUTPATIENT
Start: 2018-11-26 | End: 2018-11-26

## 2018-11-26 RX ADMIN — OXYCODONE HYDROCHLORIDE AND ACETAMINOPHEN 1000 MG: 500 TABLET ORAL at 09:21

## 2018-11-26 RX ADMIN — TETRAKIS(2-METHOXYISOBUTYLISOCYANIDE)COPPER(I) TETRAFLUOROBORATE 18.4 MILLICURIE: 1 INJECTION, POWDER, LYOPHILIZED, FOR SOLUTION INTRAVENOUS at 08:53

## 2018-11-26 RX ADMIN — ASPIRIN 81 MG: 81 TABLET, COATED ORAL at 09:21

## 2018-11-26 RX ADMIN — ANASTROZOLE 1 MG: 1 TABLET ORAL at 09:25

## 2018-11-26 RX ADMIN — PROBIOTIC PRODUCT - TAB: TAB at 09:20

## 2018-11-26 RX ADMIN — SIMVASTATIN 20 MG: 20 TABLET, FILM COATED ORAL at 09:21

## 2018-11-26 RX ADMIN — ANTACID TABLETS 500 MG: 500 TABLET, CHEWABLE ORAL at 09:21

## 2018-11-26 RX ADMIN — TETRAKIS(2-METHOXYISOBUTYLISOCYANIDE)COPPER(I) TETRAFLUOROBORATE 39 MILLICURIE: 1 INJECTION, POWDER, LYOPHILIZED, FOR SOLUTION INTRAVENOUS at 12:50

## 2018-11-26 NOTE — PLAN OF CARE
Problem: Falls - Risk of:  Goal: Will remain free from falls  Will remain free from falls   Outcome: Ongoing  Patient will use call light prior to ambulation. Bed in lowest position, wheels locked, call light within reach. RN will continue to monitor on hourly rounds and as needed.     Comments: Careplan reviewed with patient

## 2018-11-26 NOTE — DISCHARGE SUMMARY
Discharge Summary    Patient ID: Beryle Mora    MRN: [id number]     Acct:  [de-identified]       Patient's PCP: Cordell Hernandez MD    Admit Date: 11/24/2018     Discharge Date: 11/26/2018      Admitting Physician: David Lema MD    Discharge Physician: Joleen Sprague MD     Discharge Diagnoses:  Atypical Chest Pain    Primary Problem  Chest pain    Active Hospital Problems    Diagnosis Date Noted    Chest pain [R07.9] 11/24/2018    Atypical chest pain [R07.89] 11/24/2018     Past Medical History:   Diagnosis Date    Allergic rhinitis     Bilateral carpal tunnel syndrome 1/12/2018    hx, corrected by surgery, bilateral    Breast cancer, right ([address] 07/05/2016    Ductal carcinoma in situ (DCIS) of right breast     Hyperlipidemia     currently off meds, per pt's choice    Psoriasis     Thickened endometrium 11/2016    while on Tamoxifen    Wears glasses      The patient was seen and examined on day of discharge and this discharge summary is in conjunction with any daily progress note from day of discharge. Code Status:  Full Code    Hospital Course: uneventful. Consults:  cardiology    Significant Diagnostic Studies: see EHR.     Treatments: medical.    Disposition: home    Discharged Condition: Stable    Follow Up:  Cordell Hernandez MD in one week    Discharge Medications:    [address] Medication Instructions [facility] [place][id number]    Printed on:11/26/18 8862   Medication Information                      alendronate (FOSAMAX) 70 MG tablet  TAKE 1 TABLET BY MOUTH ONCE WEEKLY BEFORE BREAKFAST, ON AN EMPTY STOMACH: REMAIN UPRIGHT FOR 30 MINUTES:TAKE WITH 8 OUNCES OF WATER             anastrozole (ARIMIDEX) 1 MG tablet  TAKE ONE TABLET BY MOUTH DAILY             Ascorbic Acid (VITAMIN C) 1000 MG tablet  Take 1,000 mg by mouth daily             aspirin 81 MG EC tablet  Take 81 mg by mouth daily             calcium carbonate 600 MG TABS tablet  Take 600 mg by mouth daily cetirizine (ZYRTEC ALLERGY) 10 MG tablet  Take 10 mg by mouth daily as needed              omeprazole (PRILOSEC OTC) 20 MG tablet  Take 20 mg by mouth daily as needed (heartburn)             Probiotic Product (PROBIOTIC DAILY PO)  Take 1 tablet by mouth daily             simvastatin (ZOCOR) 20 MG tablet  Take 20 mg by mouth three times a week Mon/Wed/Fri                  Activity: activity as tolerated    Diet: regular diet    Time Spent on discharge is more than 20 minutes in the examination, evaluation, counseling and review of medications and discharge plan. Electronically signed by Sameer Patricia MD on 11/26/2018 at 4:22 PM     Thank you Dr. Aurelio Jaramillo MD for the opportunity to be involved in this patient's care.

## 2018-11-27 ENCOUNTER — HOSPITAL ENCOUNTER (OUTPATIENT)
Dept: OCCUPATIONAL THERAPY | Age: 63
Setting detail: THERAPIES SERIES
Discharge: HOME | End: 2018-11-27
Payer: COMMERCIAL

## 2018-11-27 PROCEDURE — 97110 THERAPEUTIC EXERCISES: CPT

## 2018-11-27 PROCEDURE — 97140 MANUAL THERAPY 1/> REGIONS: CPT

## 2018-11-27 NOTE — FLOWSHEET NOTE
[x] [address] [facility] floor       [address], [place]         Phone: [phone]       Fax: [phone] [] [address] [facility] at [address] [facility] , [address] [facility]  Phone: [phone]  Fax: [phone]     Occupational Therapy Daily Treatment Note    Date:  2018  Patient Name:  Ilan Nuñez    :  1955  MRN: [id number]  Physician: Shaylee Frye MD     Insurance: [facility] [employer] P[employer]      BMN, no precerts required, no copay  Medical Diagnosis: Carpal Tunnel Syndrome, Left upper limb G56.02        Rehab Codes: pain in hand M79.646, Stiffness in hand M25.64, Edema R60.0  Onset Date:                      Next Dr. Abbe Sousa:   3/9    Subjective:    Pain:  [x] Yes  [] No Location:    Pain Rating: (0-10 scale) 3/10  Pain altered Tx:  [x] No  [] Yes  Action:  Comments:    Objective:  Modalities: Modality Flow Sheet:  START STOP Tx Modality     Electrical Stim:       Ultrasound: __.8_ W/cm2 x _8__ mins  Duty factor: _x_100%  __50%  __33% __20%  Head size:  2  MHz: __1mHz  _x_3mHz  Location: flexor retinaculum     Hot Pack:     Cold Pack:     Exercises:  Flow Sheet:  Exercise Reps/Time Weight/Level Comments   theraputty 10   initiated this date   US     See above   Manual     Initiated this date                                    Other: Pt issued Median nerve stretch this date with good understanding. Pt reports some pain at times, however, enjoys the massage and putty on the scar area. STRENGTH 11-21-18      LEFT    27   Lateral pinch 9   2 point pinch 6   3 jaw pinch 7     Specific Instructions for next treatment:    Treatment Charges: Mins Units   [x]  Modalities 8 0   [x]  Ther Exercise 15 1   [x]  Manual Therapy 25 2   []  Ther Activities     []  Other         Assessment: [x] Progressing toward goals. [] No change. [] Other:  Short Term Goals: (  4    Treatments)  1.  Decrease Pain: to 1/10 with simple

## 2018-11-30 ENCOUNTER — HOSPITAL ENCOUNTER (OUTPATIENT)
Dept: OCCUPATIONAL THERAPY | Age: 63
Setting detail: THERAPIES SERIES
Discharge: HOME | End: 2018-11-30
Payer: COMMERCIAL

## 2018-11-30 PROCEDURE — 97110 THERAPEUTIC EXERCISES: CPT

## 2018-11-30 PROCEDURE — 97140 MANUAL THERAPY 1/> REGIONS: CPT

## 2018-11-30 NOTE — FLOWSHEET NOTE
functionally impaired or less  4. Decrease Edema: by .5 mm of thenar eminance  5. Independent with Home Exercise Program in 3 sessions        Long Term Goals: (  9  Treatments)  1. Demo 0/10 pain with simple work tasks  2. Demo increase I AEB lower score on DASH to 15 or less  3. Increase L UE strength by 15 pounds for increased I in work environment       Pt. Education:  [x] Yes  [] No  [] Reviewed Prior HEP/Ed  Method of Education: [x] Verbal  [] Demo  [] Written  Comprehension of Education:  [x] Verbalizes understanding. [] Demonstrates understanding. [] Needs review. [] Demonstrates/verbalizes HEP/Ed previously given.       Plan:  Continue with current plan         Time In/Out: 8771-2893   Total Treatment Time:  36      Min      Electronically signed by:  RYAN Hobson/ELISE

## 2018-12-04 ENCOUNTER — HOSPITAL ENCOUNTER (OUTPATIENT)
Dept: OCCUPATIONAL THERAPY | Age: 63
Setting detail: THERAPIES SERIES
Discharge: HOME | End: 2018-12-04
Payer: COMMERCIAL

## 2018-12-04 PROCEDURE — 97035 APP MDLTY 1+ULTRASOUND EA 15: CPT

## 2018-12-04 PROCEDURE — 97140 MANUAL THERAPY 1/> REGIONS: CPT

## 2018-12-05 ENCOUNTER — HOSPITAL ENCOUNTER (OUTPATIENT)
Dept: OCCUPATIONAL THERAPY | Age: 63
Setting detail: THERAPIES SERIES
Discharge: HOME | End: 2018-12-05
Payer: COMMERCIAL

## 2018-12-05 PROCEDURE — 97035 APP MDLTY 1+ULTRASOUND EA 15: CPT

## 2018-12-05 PROCEDURE — 97140 MANUAL THERAPY 1/> REGIONS: CPT

## 2018-12-05 NOTE — FLOWSHEET NOTE
[x] [address] [facility] floor       [address], [place]         Phone: [phone]       Fax: [phone] [] [address] [facility] at [address] [facility] , [address] [facility]  Phone: [phone]  Fax: [phone]     Occupational Therapy Daily Treatment Note    Date:  2018  Patient Name:  Radha Brown    :  1955  MRN: [id number]  Physician: Ye James MD     Insurance: [employer] PPO      BMN, no precerts required, no copay  Medical Diagnosis: Carpal Tunnel Syndrome, Left upper limb G56.02        Rehab Codes: pain in hand M79.646, Stiffness in hand M25.64, Edema R60.0  Onset Date:                      Next Dr. Santana Harris:       Subjective:    Pain:  [x] Yes  [] No Location:    Pain Rating: (0-10 scale) 3/10  Pain altered Tx:  [x] No  [] Yes  Action:  Comments:    Objective:  Modalities: Modality Flow Sheet:  START STOP Tx Modality     Electrical Stim:       Ultrasound: __.8_ W/cm2 x _8__ mins  Duty factor: _x_100%  __50%  __33% __20%  Head size:  2  MHz: __1mHz  _x_3mHz  Location: flexor retinaculum     Hot Pack:     Cold Pack:     Exercises:  Flow Sheet:  Exercise Reps/Time Weight/Level Comments   theraputty 10  soft/extra soft mix (sand) Increased this date   US     See above   Manual     Initiated this date                                    Other:Pt completed session in  mins. Used stronger resistance theraputty this date. Pt issued Median nerve stretch this date with good understanding. Pt reports some pain at times, however, enjoys the massage and putty on the scar area. Specific Instructions for next treatment:    Treatment Charges: Mins Units   [x]  Modalities 8 1   [x]  Ther Exercise 10 0   [x]  Manual Therapy 23 2   []  Ther Activities     []  Other         Assessment: [x] Progressing toward goals. [] No change. [] Other:  Short Term Goals: (  4    Treatments)  1.  Decrease Pain: to 1/10

## 2018-12-07 ENCOUNTER — HOSPITAL ENCOUNTER (OUTPATIENT)
Dept: OCCUPATIONAL THERAPY | Age: 63
Setting detail: THERAPIES SERIES
Discharge: HOME | End: 2018-12-07
Payer: COMMERCIAL

## 2018-12-07 PROCEDURE — 97140 MANUAL THERAPY 1/> REGIONS: CPT

## 2018-12-07 PROCEDURE — 97110 THERAPEUTIC EXERCISES: CPT

## 2018-12-11 ENCOUNTER — HOSPITAL ENCOUNTER (OUTPATIENT)
Dept: OCCUPATIONAL THERAPY | Age: 63
Setting detail: THERAPIES SERIES
Discharge: HOME | End: 2018-12-11
Payer: COMMERCIAL

## 2018-12-11 DIAGNOSIS — D05.11 DUCTAL CARCINOMA IN SITU (DCIS) OF RIGHT BREAST: ICD-10-CM

## 2018-12-11 PROCEDURE — 97140 MANUAL THERAPY 1/> REGIONS: CPT

## 2018-12-11 PROCEDURE — 97035 APP MDLTY 1+ULTRASOUND EA 15: CPT

## 2018-12-12 RX ORDER — ALENDRONATE SODIUM 70 MG/1
TABLET ORAL
Qty: 12 TABLET | Refills: 2 | Status: SHIPPED | OUTPATIENT
Start: 2018-12-12 | End: 2019-03-08 | Stop reason: SDUPTHER

## 2018-12-13 ENCOUNTER — HOSPITAL ENCOUNTER (OUTPATIENT)
Dept: OCCUPATIONAL THERAPY | Age: 63
Setting detail: THERAPIES SERIES
Discharge: HOME | End: 2018-12-13
Payer: COMMERCIAL

## 2018-12-13 PROCEDURE — 97110 THERAPEUTIC EXERCISES: CPT

## 2018-12-13 PROCEDURE — 97035 APP MDLTY 1+ULTRASOUND EA 15: CPT

## 2018-12-13 NOTE — CONSULTS
functionally impaired as measured with the DASH Functional Survey. 0-100 scale, with 0 = no Deficits  Discharge Note DASH level: 35%  STRENGTH 11-21-18                  LEFT    27   Lateral pinch 9   2 point pinch 6   3 jaw pinch 7      STRENGTH 12-13-18      LEFT    32   Lateral pinch 10   2 point pinch 7   3 jaw pinch 7       Problems:     [x] Pain          [x] Strength  [x] Function       [x] Coordination    [x] Sensation               Short Term Goals: (  4    Treatments)  1. Decrease Pain: to 1/10 with simple adl tasks  2. Increase strength (pounds): TBD  3. Increase function:  DASH score will be 30% functionally impaired or less  4. Decrease Edema: by .5 mm of thenar eminance  5. Independent with Home Exercise Program in 3 sessions      Long Term Goals: (  9  Treatments)  1. Demo 0/10 pain with simple work tasks  2. Demo increase I AEB lower score on DASH to 15 or less  3. Increase L UE strength by 15 pounds for increased I in work environment    Patient Goals: less swelling/pain      Treatment Potential: [x]Good []Fair []Poor  Suggested Professional Referral: []Yes [x]No  Domestic Concerns: [x]Yes []No   Barriers to Goal Achievement: []Yes [x] No    Comments/Assessment: Pt presents s/p carpal tunnel release in 9-2018. 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 Pt reports of swelling in the palm, pain in the palm, and decreased  strength. Pt tolerated massage and US with no noted pain or discomfort. Pt scar soft and supple with no noted adhesions. Pt has met max potential at this time and has no further need for OP OT at this time. Home Program Initiated:   [x  ] Written    [ x ] Verbal    [  ] Demo   Comprehension of Education: [] Yes [] No [] Needs Review  [x]Plans /[x] Goals, [x]Risks/ [x] Benefits discussed with [x] Patient []Family    Treatment Plan:  Frequency/Duration:   3  Times a week, for 9 Visits.   [x] Therapeutic Exercise 09844 [] Electrical Stim [id number]    []Neuro Re-Ed  46241  [x] Written Home Program    [] Iontophoresis 53616  [x] Therapeutic Activities 17633  [x] Manual Therapy 34366  [] Manual Therapy 35631  [x] Ultrasound 22135   [] Hot Pack/Cold Pack 16458  [] Attended Electrical Stim 61625 [x] Massage 97537    [] Ortho Fit/Train [id number]  [] Ortho Re-Fit/Check  [id number]          Treatment This Date:[x][]  Re-Evaluation Min. [x] Therapeutic Exercise         Min. 15   [x] US                         Min. 8 [x] Man            Min. 15        Flow Sheet:  Exercise Reps/Time Weight/Level Comments   theraputty 10  initiated this date   US   See above   Manual   Initiated this date                         Evaluation Complexity:  History (Personal factors, comorbidities) [x]  0 []  1-2 []  3+   Exam (limitations, restrictions) [x]  1-2 []  3 []  4+   Decision Making [x]  Low []  Moderate []  High   ? [x]  Low Complexity []  Moderate Complexity []  High Complexity       Total Treatment Time: 45    Time In: 0710   Time Out: 2039      Electronically signed by KIM Hobson on 12/13/2018 at 7:11 AM        Physician Signature: _________________________ Date: _______________  By signing above or cosigning this note, I have reviewed this plan of care and certify a need for medically necessary rehabilitation services.      *PLEASE SIGN ABOVE AND FAX BACK ALL PAGES*

## 2019-01-30 ENCOUNTER — OFFICE VISIT (OUTPATIENT)
Dept: ORTHOPEDIC SURGERY | Age: 64
End: 2019-01-30
Payer: COMMERCIAL

## 2019-01-30 VITALS — BODY MASS INDEX: 27.09 KG/M2 | WEIGHT: 138.01 LBS | HEIGHT: 60 IN

## 2019-01-30 DIAGNOSIS — M25.512 ACUTE PAIN OF LEFT SHOULDER: ICD-10-CM

## 2019-01-30 DIAGNOSIS — M75.42 SHOULDER IMPINGEMENT, LEFT: Primary | ICD-10-CM

## 2019-01-30 PROCEDURE — 20610 DRAIN/INJ JOINT/BURSA W/O US: CPT | Performed by: ORTHOPAEDIC SURGERY

## 2019-01-30 PROCEDURE — 99213 OFFICE O/P EST LOW 20 MIN: CPT | Performed by: ORTHOPAEDIC SURGERY

## 2019-01-30 RX ORDER — BUPIVACAINE HYDROCHLORIDE 2.5 MG/ML
2 INJECTION, SOLUTION INFILTRATION; PERINEURAL ONCE
Status: COMPLETED | OUTPATIENT
Start: 2019-01-30 | End: 2019-01-31

## 2019-01-30 RX ORDER — METHYLPREDNISOLONE ACETATE 80 MG/ML
80 INJECTION, SUSPENSION INTRA-ARTICULAR; INTRALESIONAL; INTRAMUSCULAR; SOFT TISSUE ONCE
Status: COMPLETED | OUTPATIENT
Start: 2019-01-30 | End: 2019-01-31

## 2019-01-30 ASSESSMENT — ENCOUNTER SYMPTOMS
WHEEZING: 0
BACK PAIN: 0
SHORTNESS OF BREATH: 0

## 2019-01-31 RX ADMIN — METHYLPREDNISOLONE ACETATE 80 MG: 80 INJECTION, SUSPENSION INTRA-ARTICULAR; INTRALESIONAL; INTRAMUSCULAR; SOFT TISSUE at 12:05

## 2019-01-31 RX ADMIN — BUPIVACAINE HYDROCHLORIDE 5 MG: 2.5 INJECTION, SOLUTION INFILTRATION; PERINEURAL at 12:05

## 2019-02-28 ENCOUNTER — HOSPITAL ENCOUNTER (OUTPATIENT)
Facility: MEDICAL CENTER | Age: 64
End: 2019-02-28
Payer: COMMERCIAL

## 2019-02-28 DIAGNOSIS — D05.11 DUCTAL CARCINOMA IN SITU (DCIS) OF RIGHT BREAST: Primary | ICD-10-CM

## 2019-03-06 ENCOUNTER — OFFICE VISIT (OUTPATIENT)
Dept: ONCOLOGY | Age: 64
End: 2019-03-06
Payer: COMMERCIAL

## 2019-03-06 ENCOUNTER — TELEPHONE (OUTPATIENT)
Dept: ONCOLOGY | Age: 64
End: 2019-03-06

## 2019-03-06 VITALS
HEART RATE: 68 BPM | RESPIRATION RATE: 16 BRPM | WEIGHT: 132.1 LBS | SYSTOLIC BLOOD PRESSURE: 116 MMHG | DIASTOLIC BLOOD PRESSURE: 80 MMHG | TEMPERATURE: 97.9 F | BODY MASS INDEX: 25.8 KG/M2

## 2019-03-06 DIAGNOSIS — D05.11 DUCTAL CARCINOMA IN SITU (DCIS) OF RIGHT BREAST: Primary | ICD-10-CM

## 2019-03-06 DIAGNOSIS — Z12.31 SCREENING MAMMOGRAM, ENCOUNTER FOR: ICD-10-CM

## 2019-03-06 PROCEDURE — 99212 OFFICE O/P EST SF 10 MIN: CPT

## 2019-03-06 PROCEDURE — 99214 OFFICE O/P EST MOD 30 MIN: CPT | Performed by: INTERNAL MEDICINE

## 2019-03-08 DIAGNOSIS — D05.11 DUCTAL CARCINOMA IN SITU (DCIS) OF RIGHT BREAST: Primary | ICD-10-CM

## 2019-03-11 RX ORDER — ALENDRONATE SODIUM 70 MG/1
TABLET ORAL
Qty: 12 TABLET | Refills: 2 | Status: SHIPPED | OUTPATIENT
Start: 2019-03-11 | End: 2019-11-16 | Stop reason: SDUPTHER

## 2019-04-01 DIAGNOSIS — D05.11 DUCTAL CARCINOMA IN SITU (DCIS) OF RIGHT BREAST: ICD-10-CM

## 2019-04-01 NOTE — TELEPHONE ENCOUNTER
RECEIVED E REQUEST FROM PHARMACY. PER MD NOTE IN MARCH: Continue arimidex  Will get mammogram and bone density scan in 6 months  Return to clinic in 6 months  PENDED TO MD FOR REVIEW.   FOLLOW UP 09/11/19

## 2019-04-03 RX ORDER — ANASTROZOLE 1 MG/1
TABLET ORAL
Qty: 90 TABLET | Refills: 3 | Status: SHIPPED | OUTPATIENT
Start: 2019-04-03 | End: 2020-05-27 | Stop reason: SDUPTHER

## 2019-07-05 DIAGNOSIS — M25.531 WRIST PAIN, RIGHT: Primary | ICD-10-CM

## 2019-07-05 DIAGNOSIS — M79.642 LEFT HAND PAIN: ICD-10-CM

## 2019-07-09 ENCOUNTER — HOSPITAL ENCOUNTER (OUTPATIENT)
Dept: MAMMOGRAPHY | Age: 64
Discharge: HOME | End: 2019-07-11
Payer: COMMERCIAL

## 2019-07-09 DIAGNOSIS — D05.11 DUCTAL CARCINOMA IN SITU (DCIS) OF RIGHT BREAST: ICD-10-CM

## 2019-07-09 PROCEDURE — 77080 DXA BONE DENSITY AXIAL: CPT

## 2019-07-11 ENCOUNTER — OFFICE VISIT (OUTPATIENT)
Dept: ORTHOPEDIC SURGERY | Age: 64
End: 2019-07-11
Payer: COMMERCIAL

## 2019-07-11 VITALS — BODY MASS INDEX: 24.93 KG/M2 | HEIGHT: 61 IN | WEIGHT: 132.06 LBS

## 2019-07-11 DIAGNOSIS — M25.531 WRIST PAIN, RIGHT: ICD-10-CM

## 2019-07-11 DIAGNOSIS — M65.322 TRIGGER INDEX FINGER OF LEFT HAND: Primary | ICD-10-CM

## 2019-07-11 PROCEDURE — 99213 OFFICE O/P EST LOW 20 MIN: CPT | Performed by: ORTHOPAEDIC SURGERY

## 2019-07-11 PROCEDURE — 20600 DRAIN/INJ JOINT/BURSA W/O US: CPT | Performed by: ORTHOPAEDIC SURGERY

## 2019-07-11 RX ORDER — LIDOCAINE HYDROCHLORIDE 10 MG/ML
0.5 INJECTION, SOLUTION INFILTRATION; PERINEURAL ONCE
Status: COMPLETED | OUTPATIENT
Start: 2019-07-11 | End: 2019-07-12

## 2019-07-11 RX ORDER — BETAMETHASONE SODIUM PHOSPHATE AND BETAMETHASONE ACETATE 3; 3 MG/ML; MG/ML
3 INJECTION, SUSPENSION INTRA-ARTICULAR; INTRALESIONAL; INTRAMUSCULAR; SOFT TISSUE ONCE
Status: COMPLETED | OUTPATIENT
Start: 2019-07-11 | End: 2019-07-12

## 2019-07-11 ASSESSMENT — ENCOUNTER SYMPTOMS
CHEST TIGHTNESS: 0
VOMITING: 0
APNEA: 0
ABDOMINAL PAIN: 0
COUGH: 0

## 2019-07-11 NOTE — PROGRESS NOTES
in about 1 month (around 8/8/2019). Orders Placed This Encounter   Medications    lidocaine 1 % injection 0.5 mL    betamethasone acetate-betamethasone sodium phosphate (CELESTONE) injection 3 mg         Orders Placed This Encounter   Procedures    20600 - DRAIN/INJECT SMALL JOINT/BURSA     I, Deepthi Iniguez LPN am scribing for and in the presence of Dr. Christopher Fernandez  7/14/2019 2:20 PM      I have reviewed and made changes accordingly to the work scribed by Deepthi Iniguez LPN. The documentation accurately reflects work and decisions made by me. I have also reviewed documentation completed by clinical staff.     Christopher Fernandez DO, [address] [facility]  7/14/2019 6:56 PM    This note is created with the assistance of a speech recognition program.  While intending to generate a document that actually reflects the content of the visit, the document can still have some errors including those of syntax and sound a like substitutions which may escape proof reading.  In such instances, actual meaning can be extrapolated by contextual diversion      Electronically signed by Richard Moncada on 7/14/2019 at 6:54 PM

## 2019-07-12 RX ADMIN — BETAMETHASONE SODIUM PHOSPHATE AND BETAMETHASONE ACETATE 3 MG: 3; 3 INJECTION, SUSPENSION INTRA-ARTICULAR; INTRALESIONAL; INTRAMUSCULAR; SOFT TISSUE at 13:39

## 2019-07-12 RX ADMIN — LIDOCAINE HYDROCHLORIDE 0.5 ML: 10 INJECTION, SOLUTION INFILTRATION; PERINEURAL at 13:39

## 2019-08-16 ENCOUNTER — OFFICE VISIT (OUTPATIENT)
Dept: ORTHOPEDIC SURGERY | Age: 64
End: 2019-08-16
Payer: COMMERCIAL

## 2019-08-16 VITALS — WEIGHT: 132 LBS | HEIGHT: 60 IN | BODY MASS INDEX: 25.91 KG/M2

## 2019-08-16 DIAGNOSIS — M25.531 WRIST PAIN, RIGHT: Primary | ICD-10-CM

## 2019-08-16 PROCEDURE — 99213 OFFICE O/P EST LOW 20 MIN: CPT | Performed by: ORTHOPAEDIC SURGERY

## 2019-08-16 PROCEDURE — 20605 DRAIN/INJ JOINT/BURSA W/O US: CPT | Performed by: ORTHOPAEDIC SURGERY

## 2019-08-16 RX ORDER — LIDOCAINE HYDROCHLORIDE 10 MG/ML
0.5 INJECTION, SOLUTION INFILTRATION; PERINEURAL ONCE
Status: COMPLETED | OUTPATIENT
Start: 2019-08-16 | End: 2019-08-16

## 2019-08-16 RX ORDER — METHYLPREDNISOLONE ACETATE 80 MG/ML
40 INJECTION, SUSPENSION INTRA-ARTICULAR; INTRALESIONAL; INTRAMUSCULAR; SOFT TISSUE ONCE
Status: COMPLETED | OUTPATIENT
Start: 2019-08-16 | End: 2019-08-16

## 2019-08-16 RX ORDER — IBUPROFEN 200 MG
200 TABLET ORAL EVERY 6 HOURS PRN
COMMUNITY
End: 2022-09-21

## 2019-08-16 RX ADMIN — METHYLPREDNISOLONE ACETATE 40 MG: 80 INJECTION, SUSPENSION INTRA-ARTICULAR; INTRALESIONAL; INTRAMUSCULAR; SOFT TISSUE at 12:46

## 2019-08-16 RX ADMIN — LIDOCAINE HYDROCHLORIDE 0.5 ML: 10 INJECTION, SOLUTION INFILTRATION; PERINEURAL at 12:46

## 2019-08-16 ASSESSMENT — ENCOUNTER SYMPTOMS
COUGH: 0
DIARRHEA: 0
NAUSEA: 0
CONSTIPATION: 0

## 2019-08-16 NOTE — PROGRESS NOTES
This Encounter   Procedures    MN ARTHROCENTESIS ASPIR&/INJ SMALL JT/BURSA W/O US     I, Dhruv Mora RN am scribing for and in the presence of Dr. Margarita Mckeon  8/20/2019 8:52 PM      I have reviewed and made changes accordingly to the work scribed by Dhruv Mora RN. The documentation accurately reflects work and decisions made by me. I have also reviewed documentation completed by clinical staff.     Margarita Mckeon DO, [address] [facility]  8/20/2019 8:54 PM    This note is created with the assistance of a speech recognition program.  While intending to generate a document that actually reflects the content of the visit, the document can still have some errors including those of syntax and sound a like substitutions which may escape proof reading.  In such instances, actual meaning can be extrapolated by contextual diversion      Electronically signed by Brant Arnett DO, FAOAO on 8/20/2019 at 8:52 PM

## 2019-09-09 ENCOUNTER — HOSPITAL ENCOUNTER (OUTPATIENT)
Dept: MAMMOGRAPHY | Age: 64
Discharge: HOME | End: 2019-09-11
Payer: COMMERCIAL

## 2019-09-09 ENCOUNTER — HOSPITAL ENCOUNTER (OUTPATIENT)
Facility: MEDICAL CENTER | Age: 64
Discharge: HOME | End: 2019-09-09
Payer: COMMERCIAL

## 2019-09-09 DIAGNOSIS — D05.11 DUCTAL CARCINOMA IN SITU (DCIS) OF RIGHT BREAST: ICD-10-CM

## 2019-09-09 DIAGNOSIS — D05.11 DUCTAL CARCINOMA IN SITU (DCIS) OF RIGHT BREAST: Primary | ICD-10-CM

## 2019-09-09 DIAGNOSIS — Z12.31 SCREENING MAMMOGRAM, ENCOUNTER FOR: ICD-10-CM

## 2019-09-09 LAB
ABSOLUTE EOS #: 0.08 K/UL (ref 0–0.44)
ABSOLUTE IMMATURE GRANULOCYTE: 0.05 K/UL (ref 0–0.3)
ABSOLUTE LYMPH #: 1.69 K/UL (ref 1.1–3.7)
ABSOLUTE MONO #: 0.37 K/UL (ref 0.1–1.2)
ALBUMIN SERPL-MCNC: 4.7 G/DL (ref 3.5–5.2)
ALBUMIN/GLOBULIN RATIO: ABNORMAL (ref 1–2.5)
ALP BLD-CCNC: 81 U/L (ref 35–104)
ALT SERPL-CCNC: 10 U/L (ref 5–33)
ANION GAP SERPL CALCULATED.3IONS-SCNC: 10 MMOL/L (ref 9–17)
AST SERPL-CCNC: 17 U/L
BASOPHILS # BLD: 0 % (ref 0–2)
BASOPHILS ABSOLUTE: <0.03 K/UL (ref 0–0.2)
BILIRUB SERPL-MCNC: 0.38 MG/DL (ref 0.3–1.2)
BUN BLDV-MCNC: 14 MG/DL (ref 8–23)
BUN/CREAT BLD: 26 (ref 9–20)
CALCIUM SERPL-MCNC: 9.7 MG/DL (ref 8.6–10.4)
CHLORIDE BLD-SCNC: 101 MMOL/L (ref 98–107)
CO2: 27 MMOL/L (ref 20–31)
CREAT SERPL-MCNC: 0.54 MG/DL (ref 0.5–0.9)
DIFFERENTIAL TYPE: ABNORMAL
EOSINOPHILS RELATIVE PERCENT: 1 % (ref 1–4)
GFR AFRICAN AMERICAN: >60 ML/MIN
GFR NON-AFRICAN AMERICAN: >60 ML/MIN
GFR SERPL CREATININE-BSD FRML MDRD: ABNORMAL ML/MIN/{1.73_M2}
GFR SERPL CREATININE-BSD FRML MDRD: ABNORMAL ML/MIN/{1.73_M2}
GLUCOSE BLD-MCNC: 130 MG/DL (ref 70–99)
HCT VFR BLD CALC: 39.1 % (ref 36.3–47.1)
HEMOGLOBIN: 12.6 G/DL (ref 11.9–15.1)
IMMATURE GRANULOCYTES: 1 %
LYMPHOCYTES # BLD: 31 % (ref 24–43)
MCH RBC QN AUTO: 30 PG (ref 25.2–33.5)
MCHC RBC AUTO-ENTMCNC: 32.2 G/DL (ref 28.4–34.8)
MCV RBC AUTO: 93.1 FL (ref 82.6–102.9)
MONOCYTES # BLD: 7 % (ref 3–12)
NRBC AUTOMATED: 0 PER 100 WBC
PDW BLD-RTO: 12.6 % (ref 11.8–14.4)
PLATELET # BLD: 224 K/UL (ref 138–453)
PLATELET ESTIMATE: ABNORMAL
PMV BLD AUTO: 9.7 FL (ref 8.1–13.5)
POTASSIUM SERPL-SCNC: 3.9 MMOL/L (ref 3.7–5.3)
RBC # BLD: 4.2 M/UL (ref 3.95–5.11)
RBC # BLD: ABNORMAL 10*6/UL
SEG NEUTROPHILS: 60 % (ref 36–65)
SEGMENTED NEUTROPHILS ABSOLUTE COUNT: 3.34 K/UL (ref 1.5–8.1)
SODIUM BLD-SCNC: 138 MMOL/L (ref 135–144)
TOTAL PROTEIN: 7.8 G/DL (ref 6.4–8.3)
WBC # BLD: 5.6 K/UL (ref 3.5–11.3)
WBC # BLD: ABNORMAL 10*3/UL

## 2019-09-09 PROCEDURE — 36415 COLL VENOUS BLD VENIPUNCTURE: CPT

## 2019-09-09 PROCEDURE — 85025 COMPLETE CBC W/AUTO DIFF WBC: CPT

## 2019-09-09 PROCEDURE — 77063 BREAST TOMOSYNTHESIS BI: CPT

## 2019-09-09 PROCEDURE — 80053 COMPREHEN METABOLIC PANEL: CPT

## 2019-09-10 ENCOUNTER — HOSPITAL ENCOUNTER (OUTPATIENT)
Facility: MEDICAL CENTER | Age: 64
End: 2019-09-10
Payer: COMMERCIAL

## 2019-09-11 ENCOUNTER — OFFICE VISIT (OUTPATIENT)
Dept: ONCOLOGY | Age: 64
End: 2019-09-11
Payer: COMMERCIAL

## 2019-09-11 ENCOUNTER — TELEPHONE (OUTPATIENT)
Dept: ONCOLOGY | Age: 64
End: 2019-09-11

## 2019-09-11 VITALS
BODY MASS INDEX: 24.8 KG/M2 | DIASTOLIC BLOOD PRESSURE: 92 MMHG | SYSTOLIC BLOOD PRESSURE: 141 MMHG | TEMPERATURE: 98 F | WEIGHT: 127 LBS | HEART RATE: 65 BPM

## 2019-09-11 DIAGNOSIS — M81.0 OSTEOPOROSIS WITHOUT CURRENT PATHOLOGICAL FRACTURE, UNSPECIFIED OSTEOPOROSIS TYPE: ICD-10-CM

## 2019-09-11 DIAGNOSIS — D05.11 DUCTAL CARCINOMA IN SITU (DCIS) OF RIGHT BREAST: Primary | ICD-10-CM

## 2019-09-11 PROCEDURE — 99214 OFFICE O/P EST MOD 30 MIN: CPT | Performed by: INTERNAL MEDICINE

## 2019-09-11 PROCEDURE — 99211 OFF/OP EST MAY X REQ PHY/QHP: CPT | Performed by: INTERNAL MEDICINE

## 2019-09-11 NOTE — PROGRESS NOTES
Endocrine: negative for heat or cold intolerance, tremor, weight changes, change in bowel habits and hair loss   Musculoskeletal: negative for myalgias, arthralgias, pain, joint swelling,and bone pain   Neurological: negative for headaches, dizziness, seizures, weakness, numbness  OBJECTIVE:         Vitals:    09/11/19 0830   BP: (!) 141/92   Pulse: 65   Temp: 98 °F (36.7 °C)      Vital signs were reviewed.     PHYSICAL EXAM:   General appearance - well appearing, no in pain or distress   Mental status - alert and cooperative   Eyes - pupils equal and reactive, extraocular eye movements intact   Mouth - mucous membranes moist, pharynx normal without lesions   Neck - supple, no significant adenopathy   Lymphatics - no palpable lymphadenopathy, no hepatosplenomegaly   Chest - clear to auscultation, no wheezes, rales or rhonchi, symmetric air entry  Status post lumpectomy right side, surgical margin healing well   Heart - normal rate, regular rhythm, normal S1, S2, no murmurs, rubs, clicks or gallops   Abdomen - soft, nontender, nondistended, no masses or organomegaly   Neurological - alert, oriented, normal speech, no focal findings or movement disorder noted   Musculoskeletal - no joint tenderness, deformity or swelling   Extremities - peripheral pulses normal, no pedal edema, no clubbing or cyanosis   Skin - normal coloration and turgor, no rashes, no suspicious skin lesions noted   LABORATORY DATA:     Lab Results   Component Value Date    WBC 5.6 09/09/2019    HGB 12.6 09/09/2019    HCT 39.1 09/09/2019    MCV 93.1 09/09/2019     09/09/2019    LYMPHOPCT 31 09/09/2019    RBC 4.20 09/09/2019    MCH 30.0 09/09/2019    MCHC 32.2 09/09/2019    RDW 12.6 09/09/2019    MONOPCT 7 09/09/2019    BASOPCT 0 09/09/2019    NEUTROABS 3.34 09/09/2019    LYMPHSABS 1.69 09/09/2019    MONOSABS 0.37 09/09/2019    EOSABS 0.08 09/09/2019    BASOSABS <0.03 09/09/2019         Chemistry        Component Value Date/Time

## 2019-10-03 ENCOUNTER — TELEPHONE (OUTPATIENT)
Dept: ONCOLOGY | Age: 64
End: 2019-10-03

## 2019-11-16 DIAGNOSIS — D05.11 DUCTAL CARCINOMA IN SITU (DCIS) OF RIGHT BREAST: ICD-10-CM

## 2019-11-20 RX ORDER — ALENDRONATE SODIUM 70 MG/1
TABLET ORAL
Qty: 12 TABLET | Refills: 2 | Status: SHIPPED | OUTPATIENT
Start: 2019-11-20 | End: 2020-09-23 | Stop reason: ALTCHOICE

## 2020-01-06 ENCOUNTER — OFFICE VISIT (OUTPATIENT)
Dept: ORTHOPEDIC SURGERY | Age: 65
End: 2020-01-06
Payer: COMMERCIAL

## 2020-01-06 VITALS — HEIGHT: 60 IN | WEIGHT: 127 LBS | BODY MASS INDEX: 24.94 KG/M2

## 2020-01-06 PROCEDURE — 99213 OFFICE O/P EST LOW 20 MIN: CPT | Performed by: ORTHOPAEDIC SURGERY

## 2020-01-06 RX ORDER — METHYLPREDNISOLONE 4 MG/1
TABLET ORAL
Qty: 1 KIT | Refills: 0 | Status: SHIPPED | OUTPATIENT
Start: 2020-01-06 | End: 2020-01-12

## 2020-01-06 ASSESSMENT — ENCOUNTER SYMPTOMS
COUGH: 0
NAUSEA: 0
DIARRHEA: 0
CONSTIPATION: 0

## 2020-01-06 NOTE — PROGRESS NOTES
Inspection of the Left hand and wrist shows mild nodular-like swelling of the first dorsal wrist compartment at the radial styloid with tenderness noted greatest over the nodular swelling. No other outward deformity of the Left  hand/wrist is noted. Grossly positive finkelstein's test is noted. Patient has full AROM of the Left wrist and finger and thumb. Motor, sensation, vascular exam is grossly intact to the hand and upper extremity without focal deficits. Not able to make a full composite fist on the left. Neuro: alert and oriented to person and place. Eyes: Extra-ocular muscles intact  Mouth: Oral mucosa moist. No perioral lesions  Pulm: Respirations unlabored and regular. Symmetric chest excursion without outward deformity is noted. Skin: warm, well perfused  Psych:   Patient has good fund of knowledge and displays understanging of exam, diagnosis, and plan. Radiology:     Xr Wrist Left (min 3 Views)    Result Date: 1/6/2020  History: Left wrist pain Findings: AP, lateral, oblique x-rays of the left wrist done in the office today shows mild radial scaphoid degenerative narrowing with cystic changes noted within multiple carpal bones. No evidence of fracture, subluxation, dislocation, radiopaque foreign body, radiopaque tumors appreciated. Impression: Mild left wrist degenerative changes as described above. Assessment:      1. De Quervain's tenosynovitis, left    2. Tenosynovitis of left hand       Plan:      Discussed etiology and natural history of left de quervains tenosynovitis/left hand tenosynovitis. The treatment options may include oral anti-inflammatories, bracing, injections, advanced imaging, activity modification, physical therapy and/or surgical intervention. The patient would like to proceed with thumb spica brace, medrol dose pack, and warm soaks. The patient will follow up after she gets back from [facility].   We discussed that the patient should call us with any concerns or questions. Follow up:Return in about 4 weeks (around 2/3/2020). Orders Placed This Encounter   Medications    methylPREDNISolone (MEDROL DOSEPACK) 4 MG tablet     Sig: Take by mouth. Dispense:  1 kit     Refill:  0         Orders Placed This Encounter   Procedures    XR WRIST LEFT (MIN 3 VIEWS)     Standing Status:   Future     Number of Occurrences:   1     Standing Expiration Date:   1/6/2021     IMayank RN am scribing for and in the presence of Dr. Lainey Cueto  1/7/2020 8:45 PM      I have reviewed and made changes accordingly to the work scribed by Mayank Salinas RN. The documentation accurately reflects work and decisions made by me. I have also reviewed documentation completed by clinical staff.     Lainey Cueto DO, [facility]  1/7/2020 8:46 PM    This note is created with the assistance of a speech recognition program.  While intending to generate a document that actually reflects the content of the visit, the document can still have some errors including those of syntax and sound a like substitutions which may escape proof reading.  In such instances, actual meaning can be extrapolated by contextual diversion      Electronically signed by Ophelia Greenfield on 1/7/2020 at 8:45 PM

## 2020-01-29 NOTE — CARE COORDINATION
01/29/20 1500   General Information   Has Not Attended OT as of: 01/29/20     Pt has not attended OT since admit.  Will continue to encourage participation and completion of  self-assessment as able. OT staff will explain the purpose of being involved with treatment plan and provide options to meet current needs and goals.      Case Management Initial Discharge Plan  [address],         Readmission Risk              Risk of Unplanned Readmission:        8             Met with:patient to discuss discharge plans. Information verified: address, contacts, phone number, , insurance Yes  PCP: Bhargavi Lincoln MD  Date of last visit: in urgent care , few months for pcp     Insurance Provider: [employer]     Discharge Planning  Current Residence:  Private home   Living Arrangements:  Spouse/Significant Other       Home has 1  Stories/2  stairs to climb  Support Systems:  Spouse/Significant Other       Current Services PTA:  None   Agency: none       Patient able to perform ADL's:Independent  DME in home:  None   DME used to aid ambulation prior to admission:  na  DME used during admission:  None     Potential Assistance Needed:  N/A    Pharm[facility] [address]    Potential Assistance Purchasing Medications:  No  Does patient want to participate in local refill/ meds to beds program?  No    Patient agreeable to home care: No  [place] of choice provided:  n/a      Type of Home Care Services:  None  Patient expects to be discharged to:  home    Prior SNF/Rehab Placement and Facility: none   Agreeable to SNF/Rehab: No  [place] of choice provided: n/a   Evaluation: n/a    Expected Discharge date:   18  Follow Up Appointment: Best Day/ Time:  afternoon    Transportation provider: per spouse  Transportation arrangements needed for discharge: No    Discharge Plan:   Patient lives at home with spouse. 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 She is completely independent and active . No anticipated needs at this time.      Patient to have stress test in am and anticipate If normal will be discharged home     Electronically signed by Aly Carrasco RN on 18 at 12:33 PM

## 2020-03-18 ENCOUNTER — HOSPITAL ENCOUNTER (OUTPATIENT)
Dept: INFUSION THERAPY | Facility: MEDICAL CENTER | Age: 65
Discharge: HOME | End: 2020-03-18
Payer: COMMERCIAL

## 2020-03-18 ENCOUNTER — OFFICE VISIT (OUTPATIENT)
Dept: ONCOLOGY | Age: 65
End: 2020-03-18
Payer: COMMERCIAL

## 2020-03-18 ENCOUNTER — HOSPITAL ENCOUNTER (OUTPATIENT)
Facility: MEDICAL CENTER | Age: 65
Discharge: HOME | End: 2020-03-18
Payer: COMMERCIAL

## 2020-03-18 ENCOUNTER — TELEPHONE (OUTPATIENT)
Dept: ONCOLOGY | Age: 65
End: 2020-03-18

## 2020-03-18 VITALS
SYSTOLIC BLOOD PRESSURE: 132 MMHG | HEART RATE: 74 BPM | TEMPERATURE: 97.9 F | DIASTOLIC BLOOD PRESSURE: 86 MMHG | BODY MASS INDEX: 27.07 KG/M2 | WEIGHT: 138.6 LBS

## 2020-03-18 DIAGNOSIS — M81.0 OSTEOPOROSIS WITHOUT CURRENT PATHOLOGICAL FRACTURE, UNSPECIFIED OSTEOPOROSIS TYPE: Primary | ICD-10-CM

## 2020-03-18 DIAGNOSIS — D05.11 DUCTAL CARCINOMA IN SITU (DCIS) OF RIGHT BREAST: ICD-10-CM

## 2020-03-18 LAB
ABSOLUTE EOS #: 0.09 K/UL (ref 0–0.44)
ABSOLUTE IMMATURE GRANULOCYTE: 0.03 K/UL (ref 0–0.3)
ABSOLUTE LYMPH #: 1.78 K/UL (ref 1.1–3.7)
ABSOLUTE MONO #: 0.46 K/UL (ref 0.1–1.2)
ALBUMIN SERPL-MCNC: 4.7 G/DL (ref 3.5–5.2)
ALBUMIN/GLOBULIN RATIO: ABNORMAL (ref 1–2.5)
ALP BLD-CCNC: 93 U/L (ref 35–104)
ALT SERPL-CCNC: 13 U/L (ref 5–33)
ANION GAP SERPL CALCULATED.3IONS-SCNC: 12 MMOL/L (ref 9–17)
AST SERPL-CCNC: 18 U/L
BASOPHILS # BLD: 0 % (ref 0–2)
BASOPHILS ABSOLUTE: <0.03 K/UL (ref 0–0.2)
BILIRUB SERPL-MCNC: 0.62 MG/DL (ref 0.3–1.2)
BUN BLDV-MCNC: 16 MG/DL (ref 8–23)
BUN/CREAT BLD: 29 (ref 9–20)
CALCIUM SERPL-MCNC: 9.7 MG/DL (ref 8.6–10.4)
CHLORIDE BLD-SCNC: 103 MMOL/L (ref 98–107)
CO2: 26 MMOL/L (ref 20–31)
CREAT SERPL-MCNC: 0.55 MG/DL (ref 0.5–0.9)
DIFFERENTIAL TYPE: ABNORMAL
EOSINOPHILS RELATIVE PERCENT: 2 % (ref 1–4)
GFR AFRICAN AMERICAN: >60 ML/MIN
GFR NON-AFRICAN AMERICAN: >60 ML/MIN
GFR SERPL CREATININE-BSD FRML MDRD: ABNORMAL ML/MIN/{1.73_M2}
GFR SERPL CREATININE-BSD FRML MDRD: ABNORMAL ML/MIN/{1.73_M2}
GLUCOSE BLD-MCNC: 119 MG/DL (ref 70–99)
HCT VFR BLD CALC: 39.5 % (ref 36.3–47.1)
HEMOGLOBIN: 12.6 G/DL (ref 11.9–15.1)
IMMATURE GRANULOCYTES: 1 %
LYMPHOCYTES # BLD: 32 % (ref 24–43)
MCH RBC QN AUTO: 30.3 PG (ref 25.2–33.5)
MCHC RBC AUTO-ENTMCNC: 31.9 G/DL (ref 28.4–34.8)
MCV RBC AUTO: 95 FL (ref 82.6–102.9)
MONOCYTES # BLD: 8 % (ref 3–12)
NRBC AUTOMATED: 0 PER 100 WBC
PDW BLD-RTO: 12.5 % (ref 11.8–14.4)
PLATELET # BLD: 232 K/UL (ref 138–453)
PLATELET ESTIMATE: ABNORMAL
PMV BLD AUTO: 9.4 FL (ref 8.1–13.5)
POTASSIUM SERPL-SCNC: 4.7 MMOL/L (ref 3.7–5.3)
RBC # BLD: 4.16 M/UL (ref 3.95–5.11)
RBC # BLD: ABNORMAL 10*6/UL
SEG NEUTROPHILS: 57 % (ref 36–65)
SEGMENTED NEUTROPHILS ABSOLUTE COUNT: 3.15 K/UL (ref 1.5–8.1)
SODIUM BLD-SCNC: 141 MMOL/L (ref 135–144)
TOTAL PROTEIN: 7.9 G/DL (ref 6.4–8.3)
WBC # BLD: 5.5 K/UL (ref 3.5–11.3)
WBC # BLD: ABNORMAL 10*3/UL

## 2020-03-18 PROCEDURE — 80053 COMPREHEN METABOLIC PANEL: CPT

## 2020-03-18 PROCEDURE — 96401 CHEMO ANTI-NEOPL SQ/IM: CPT

## 2020-03-18 PROCEDURE — 85025 COMPLETE CBC W/AUTO DIFF WBC: CPT

## 2020-03-18 PROCEDURE — 36415 COLL VENOUS BLD VENIPUNCTURE: CPT

## 2020-03-18 PROCEDURE — 6360000002 HC RX W HCPCS: Performed by: INTERNAL MEDICINE

## 2020-03-18 PROCEDURE — 99214 OFFICE O/P EST MOD 30 MIN: CPT | Performed by: INTERNAL MEDICINE

## 2020-03-18 PROCEDURE — 99212 OFFICE O/P EST SF 10 MIN: CPT

## 2020-03-18 RX ORDER — METHYLPREDNISOLONE SODIUM SUCCINATE 125 MG/2ML
125 INJECTION, POWDER, LYOPHILIZED, FOR SOLUTION INTRAMUSCULAR; INTRAVENOUS ONCE
Status: CANCELLED | OUTPATIENT
Start: 2020-03-18

## 2020-03-18 RX ORDER — SODIUM CHLORIDE 9 MG/ML
INJECTION, SOLUTION INTRAVENOUS CONTINUOUS
Status: CANCELLED | OUTPATIENT
Start: 2020-03-18

## 2020-03-18 RX ORDER — METHYLPREDNISOLONE SODIUM SUCCINATE 125 MG/2ML
125 INJECTION, POWDER, LYOPHILIZED, FOR SOLUTION INTRAMUSCULAR; INTRAVENOUS ONCE
Status: CANCELLED | OUTPATIENT
Start: 2020-09-16

## 2020-03-18 RX ORDER — DIPHENHYDRAMINE HYDROCHLORIDE 50 MG/ML
50 INJECTION INTRAMUSCULAR; INTRAVENOUS ONCE
Status: CANCELLED | OUTPATIENT
Start: 2020-03-18

## 2020-03-18 RX ORDER — SODIUM CHLORIDE 9 MG/ML
INJECTION, SOLUTION INTRAVENOUS CONTINUOUS
Status: CANCELLED | OUTPATIENT
Start: 2020-09-16

## 2020-03-18 RX ORDER — DIPHENHYDRAMINE HYDROCHLORIDE 50 MG/ML
50 INJECTION INTRAMUSCULAR; INTRAVENOUS ONCE
Status: CANCELLED | OUTPATIENT
Start: 2020-09-16

## 2020-03-18 RX ADMIN — DENOSUMAB 60 MG: 60 INJECTION SUBCUTANEOUS at 09:16

## 2020-03-18 NOTE — PROGRESS NOTES
Patient ID: Wolf Markham, 1955, [id number], 60 y.o. 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 Patient had reexcision of margins on 8/11/16  As patient had plans to travel to [place] in October her adjuvant radiation therapy was delayed by radiation oncology social was put on endocrine therapy with tamoxifen  RT started 11/20/16 and completed   Her pelvic ultrasound showed thickened endometrium and she is seen by gynecologist. Patient is worried about the risk of endometrial cancer and wants to switch to Arimidex. Now on arimidex  She has osteoporosis and will plan to start her on Prolia     HISTORY OF PRESENT ILLNESS:    Oncologic History:  Ms Anais Hawthorne is a 61-year-old female with no significant past medical history except hyperlipidemia was seen during initial consultation visit for newly diagnosed right sided ductal carcinoma in situ.     Patient had routine screening mammogram which showed calcifications in 2014. This was being closely followed until recently in June the mammogram showed increase in number of calcifications.    Pt had Biopsy on 7/1/16 which showed DCIS.     Her Mother diagnosed at 78 with breast cancer. She never smoked. NO ETOH abuse. 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 She denied any pain, nipple discharge, nipple and sking changes.     INTERVAL HISTORY:  Patient is returning for follow-up visit for her breast cancer. She denies any new lumps, mass, skin changes in her breast.  She denies any new lumps, mass, skin changes in her breast.    She will start Prolia today. She is going to [place] this week and will return in August.      She denied any hot flashes. She is tolerating arimidex well. Denied any pain. No fever or chills.     During this visit patient's allergy, social, medical, surgical history and medications were reviewed and bruises.    Hematologic/Lymphatic: negative for easy bruising, bleeding, lymphadenopathy, petechiae and swelling/edema   Endocrine: negative for heat or cold intolerance, tremor, weight changes, change in bowel habits and hair loss   Musculoskeletal: negative for myalgias, arthralgias, pain, joint swelling,and bone pain   Neurological: negative for headaches, dizziness, seizures, weakness, numbness  OBJECTIVE:             Vitals:     09/11/19 0830   BP: (!) 141/92   Pulse: 65   Temp: 98 °F (36.7 °C)      Vital signs were reviewed.     PHYSICAL EXAM:   General appearance - well appearing, no in pain or distress   Mental status - alert and cooperative   Eyes - pupils equal and reactive, extraocular eye movements intact   Mouth - mucous membranes moist, pharynx normal without lesions   Neck - supple, no significant adenopathy   Lymphatics - no palpable lymphadenopathy, no hepatosplenomegaly   Chest - clear to auscultation, no wheezes, rales or rhonchi, symmetric air entry  Status post lumpectomy right side, surgical margin healing well   Heart - normal rate, regular rhythm, normal S1, S2, no murmurs, rubs, clicks or gallops   Abdomen - soft, nontender, nondistended, no masses or organomegaly   Neurological - alert, oriented, normal speech, no focal findings or movement disorder noted   Musculoskeletal - no joint tenderness, deformity or swelling   Extremities - peripheral pulses normal, no pedal edema, no clubbing or cyanosis   Skin - normal coloration and turgor, no rashes, no suspicious skin lesions noted   LABORATORY DATA:            Lab Results   Component Value Date     WBC 5.6 09/09/2019     HGB 12.6 09/09/2019     HCT 39.1 09/09/2019     MCV 93.1 09/09/2019      09/09/2019     LYMPHOPCT 31 09/09/2019     RBC 4.20 09/09/2019     MCH 30.0 09/09/2019     MCHC 32.2 09/09/2019     RDW 12.6 09/09/2019     MONOPCT 7 09/09/2019     BASOPCT 0 09/09/2019     NEUTROABS 3.34 09/09/2019     LYMPHSABS 1.69 09/09/2019   MONOSABS 0.37 09/09/2019     EOSABS 0.08 09/09/2019     BASOSABS <0.03 09/09/2019           Chemistry               Component Value Date/Time      09/09/2019 0935     K 3.9 09/09/2019 0935      09/09/2019 0935     CO2 27 09/09/2019 0935     BUN 14 09/09/2019 0935     CREATININE 0.54 09/09/2019 0935               Component Value Date/Time     CALCIUM 9.7 09/09/2019 0935     ALKPHOS 81 09/09/2019 0935     AST 17 09/09/2019 0935     ALT 10 09/09/2019 0935     BILITOT 0.38 09/09/2019 0935          PATHOLOGY DATA:   Collected: 7/1/2016   -- Diagnosis --   RIGHT BREAST, 9 O'CLOCK, STEREOTACTIC BIOPSIES:   -  LOW-GRADE DUCTAL CARCINOMA IN SITU, SOLID AND   MICROPAPILLARY/CRIBRIFORM PATTERNS, ER POSITIVE, LA POSITIVE, WITH ASSOCIATED FOCAL MICROCALCIFICATIONS.      Surgical pathology: 8/2/2016   -- Diagnosis --     1.  RIGHT BREAST, RE-EXCISION LUMPECTOMY:      LOW GRADE DUCTAL CARCINOMA IN SITU, SOLID AND  MICROPAPILLARY/CRIBRIFORM PATTERNS.         PREVIOUSLY DETERMINED TO ER POSITIVE AND LA POSITIVE.       THE MEDIAL MARGIN IS POSITIVE FOR DCIS.         THE INFERIOR MARGIN IS FREE BUT CLOSE AT 2 MM.         SEE CHECKLIST FOR DISCUSSION OF MARGINS. 2.  ADDITIONAL SUPERIOR TO DEEP MARGINAL TISSUE:         NEGATIVE FOR ATYPIA AND MALIGNANCY        Surgical pathology: 8/11/2016   -- Diagnosis --   1.  RIGHT BREAST MASS, RE-EXCISION:       -  NEGATIVE FOR RESIDUAL DUCTAL CARCINOMA IN SITU (FINAL MARGINS          NEGATIVE; SEE PREVIOUS SPECIMEN [id number]).     -  FIBROCYSTIC CHANGES WITH DUCTAL PAPILLOMATOSIS AND FOCAL          ADENOSIS. 2.  RIGHT BREAST MASS, ADDITIONAL MEDIAL DEEP MARGIN:       -  NEGATIVE FOR RESIDUAL DUCTAL CARCINOMA IN SITU         -  FIBROCYSTIC CHANGES WITH DUCTAL PAPILLOMATOSIS AND FOCAL          ADENOSIS. IMAGING DATA:    Bilateral screening mammography 9/9/2019  Impression   Stable post treatment changes right breast.  No evidence of malignancy.    Advise annual screening mammography.       BREAST DENSITY SUMMARY C: The breasts are heterogeneously dense which may   obscure small masses.       BI-RADS 2       BIRADS:   BIRADS - CATEGORY 2       Benign, no evidence of malignancy.  Normal interval follow-up is recommended   in 12 months.       OVERALL ASSESSMENT - BENIGN       A letter of notification will be sent to the patient regarding the results.       The [address] [place] [address] of Radiology recommends annual mammograms for women 40   years and older.         ASSESSMENT:    Ms Brad Washburn is a 53-year-old female with newly diagnosed, right-sided DCIS, ER positive. She had lumpectomy on 8/2/16. Her radiation therapy was started on 11/20 and completed in jan 2017.  Now on Arimidex and tolerating well. Her bone density scan in July showed osteoporosis which seems to have progressed from her previous DEXA scan which showed osteopenia. She was taking Fosamax. Therefore I will switch her to Prolia and she will continue calcium and vitamin D. She would like to finish the Fosamax pills and then switch to Prolia. We will arrange Prolia with her next visit. I discussed the results of recent mammogram which showed no evidence of recurrence. She will continue annual mammography  She reports epigastric pain and GERD symptoms and she is following with gastroenterology. During today's visit, the patient and the family had a number of reasonable questions which were answered to their satisfaction.  They verbalized understanding of the information provided and they agreed to proceed as outlined above.       PLAN:   Continue arimidex  We will get a mammogram annually  Next mammogram will be done in September  Advised to take calcium and vitamin D  Plan to start Prolia today and continue every 6-month  Return to clinic in 6 months        I spent more than 25 minutes examining, evaluating, reviewing data and counseling the patient. Lorena Nealence than 50% of that time was spent face-to-face with the patient in counseling and coordinating her care.

## 2020-03-18 NOTE — TELEPHONE ENCOUNTER
Ewelina Turcios MD VISIT & TX  DR Alexander Lewis IN TO SEE PATIENT  ORDERS RECEIVED  RV 6 MONTHS W/MAMMOGRAM  PROLIA EVERY 6 MONTHS (TODAY)  MAMMOGRAM SCHEDULED FOR 09/10/20 @9:15AM  Lisa Ibrahim MD VISIT 09/23/20 @8AM  [email or web address]  AVS PRINTED AND GIVEN TO PATIENT WITH INSTRUCTIONS  PATIENT DISCHARGED TO [address] [place] [address]

## 2020-03-18 NOTE — PROGRESS NOTES
Diamond Post arrives ambulatory from MD visit  Order for prolia injection  Labs reviewed  prolia given to lt upper arm   Band aid applied  Discharged per ambulatory

## 2020-04-14 ENCOUNTER — TELEPHONE (OUTPATIENT)
Dept: ONCOLOGY | Age: 65
End: 2020-04-14

## 2020-04-14 NOTE — TELEPHONE ENCOUNTER
Fax rec'd from Giovanna Hernandez at [facility] benefits, need to have recent progress notes, med list, order for xgeva and next appt   Fax [phone]    All requested information faxed to Jax, confirmation rec'd     prolia was given on 3-, per the referral tab, no pre cert needed

## 2020-05-27 RX ORDER — ANASTROZOLE 1 MG/1
TABLET ORAL
Qty: 90 TABLET | Refills: 3 | Status: SHIPPED | OUTPATIENT
Start: 2020-05-27 | End: 2021-09-22 | Stop reason: ALTCHOICE

## 2020-09-10 ENCOUNTER — HOSPITAL ENCOUNTER (OUTPATIENT)
Dept: MAMMOGRAPHY | Age: 65
Discharge: HOME | End: 2020-09-12
Payer: COMMERCIAL

## 2020-09-10 PROCEDURE — 77063 BREAST TOMOSYNTHESIS BI: CPT

## 2020-09-23 ENCOUNTER — HOSPITAL ENCOUNTER (OUTPATIENT)
Facility: MEDICAL CENTER | Age: 65
Discharge: HOME | End: 2020-09-23
Payer: COMMERCIAL

## 2020-09-23 ENCOUNTER — TELEPHONE (OUTPATIENT)
Dept: ONCOLOGY | Age: 65
End: 2020-09-23

## 2020-09-23 ENCOUNTER — OFFICE VISIT (OUTPATIENT)
Dept: ONCOLOGY | Age: 65
End: 2020-09-23
Payer: COMMERCIAL

## 2020-09-23 ENCOUNTER — HOSPITAL ENCOUNTER (OUTPATIENT)
Dept: INFUSION THERAPY | Facility: MEDICAL CENTER | Age: 65
Discharge: HOME | End: 2020-09-23
Payer: COMMERCIAL

## 2020-09-23 VITALS
BODY MASS INDEX: 26.46 KG/M2 | HEART RATE: 65 BPM | RESPIRATION RATE: 16 BRPM | DIASTOLIC BLOOD PRESSURE: 88 MMHG | SYSTOLIC BLOOD PRESSURE: 135 MMHG | TEMPERATURE: 96 F | WEIGHT: 135.5 LBS

## 2020-09-23 DIAGNOSIS — D05.11 DUCTAL CARCINOMA IN SITU (DCIS) OF RIGHT BREAST: ICD-10-CM

## 2020-09-23 DIAGNOSIS — M81.0 OSTEOPOROSIS WITHOUT CURRENT PATHOLOGICAL FRACTURE, UNSPECIFIED OSTEOPOROSIS TYPE: Primary | ICD-10-CM

## 2020-09-23 LAB
ABSOLUTE EOS #: 0.16 K/UL (ref 0–0.44)
ABSOLUTE IMMATURE GRANULOCYTE: 0.03 K/UL (ref 0–0.3)
ABSOLUTE LYMPH #: 2.04 K/UL (ref 1.1–3.7)
ABSOLUTE MONO #: 0.43 K/UL (ref 0.1–1.2)
ALBUMIN SERPL-MCNC: 4.6 G/DL (ref 3.5–5.2)
ALBUMIN/GLOBULIN RATIO: ABNORMAL (ref 1–2.5)
ALP BLD-CCNC: 75 U/L (ref 35–104)
ALT SERPL-CCNC: 11 U/L (ref 5–33)
ANION GAP SERPL CALCULATED.3IONS-SCNC: 9 MMOL/L (ref 9–17)
AST SERPL-CCNC: 18 U/L
BASOPHILS # BLD: 1 % (ref 0–2)
BASOPHILS ABSOLUTE: 0.04 K/UL (ref 0–0.2)
BILIRUB SERPL-MCNC: 0.48 MG/DL (ref 0.3–1.2)
BUN BLDV-MCNC: 14 MG/DL (ref 8–23)
BUN/CREAT BLD: 26 (ref 9–20)
CALCIUM SERPL-MCNC: 9.6 MG/DL (ref 8.6–10.4)
CHLORIDE BLD-SCNC: 103 MMOL/L (ref 98–107)
CO2: 29 MMOL/L (ref 20–31)
CREAT SERPL-MCNC: 0.54 MG/DL (ref 0.5–0.9)
DIFFERENTIAL TYPE: ABNORMAL
EOSINOPHILS RELATIVE PERCENT: 3 % (ref 1–4)
GFR AFRICAN AMERICAN: >60 ML/MIN
GFR NON-AFRICAN AMERICAN: >60 ML/MIN
GFR SERPL CREATININE-BSD FRML MDRD: ABNORMAL ML/MIN/{1.73_M2}
GFR SERPL CREATININE-BSD FRML MDRD: ABNORMAL ML/MIN/{1.73_M2}
GLUCOSE BLD-MCNC: 107 MG/DL (ref 70–99)
HCT VFR BLD CALC: 39.5 % (ref 36.3–47.1)
HEMOGLOBIN: 12.4 G/DL (ref 11.9–15.1)
IMMATURE GRANULOCYTES: 1 %
LYMPHOCYTES # BLD: 31 % (ref 24–43)
MCH RBC QN AUTO: 30.5 PG (ref 25.2–33.5)
MCHC RBC AUTO-ENTMCNC: 31.4 G/DL (ref 28.4–34.8)
MCV RBC AUTO: 97.3 FL (ref 82.6–102.9)
MONOCYTES # BLD: 7 % (ref 3–12)
NRBC AUTOMATED: 0 PER 100 WBC
PDW BLD-RTO: 12.5 % (ref 11.8–14.4)
PLATELET # BLD: 210 K/UL (ref 138–453)
PLATELET ESTIMATE: ABNORMAL
PMV BLD AUTO: 9.7 FL (ref 8.1–13.5)
POTASSIUM SERPL-SCNC: 4.6 MMOL/L (ref 3.7–5.3)
RBC # BLD: 4.06 M/UL (ref 3.95–5.11)
RBC # BLD: ABNORMAL 10*6/UL
SEG NEUTROPHILS: 57 % (ref 36–65)
SEGMENTED NEUTROPHILS ABSOLUTE COUNT: 3.83 K/UL (ref 1.5–8.1)
SODIUM BLD-SCNC: 141 MMOL/L (ref 135–144)
TOTAL PROTEIN: 7.9 G/DL (ref 6.4–8.3)
WBC # BLD: 6.5 K/UL (ref 3.5–11.3)
WBC # BLD: ABNORMAL 10*3/UL

## 2020-09-23 PROCEDURE — 85025 COMPLETE CBC W/AUTO DIFF WBC: CPT

## 2020-09-23 PROCEDURE — 96401 CHEMO ANTI-NEOPL SQ/IM: CPT

## 2020-09-23 PROCEDURE — 80053 COMPREHEN METABOLIC PANEL: CPT

## 2020-09-23 PROCEDURE — 36415 COLL VENOUS BLD VENIPUNCTURE: CPT

## 2020-09-23 PROCEDURE — 6360000002 HC RX W HCPCS: Performed by: INTERNAL MEDICINE

## 2020-09-23 PROCEDURE — 99214 OFFICE O/P EST MOD 30 MIN: CPT | Performed by: INTERNAL MEDICINE

## 2020-09-23 PROCEDURE — 99211 OFF/OP EST MAY X REQ PHY/QHP: CPT | Performed by: INTERNAL MEDICINE

## 2020-09-23 RX ORDER — DIPHENHYDRAMINE HYDROCHLORIDE 50 MG/ML
50 INJECTION INTRAMUSCULAR; INTRAVENOUS ONCE
Status: CANCELLED | OUTPATIENT
Start: 2021-03-17

## 2020-09-23 RX ORDER — SODIUM CHLORIDE 9 MG/ML
INJECTION, SOLUTION INTRAVENOUS CONTINUOUS
Status: CANCELLED | OUTPATIENT
Start: 2021-03-17

## 2020-09-23 RX ORDER — METHYLPREDNISOLONE SODIUM SUCCINATE 125 MG/2ML
125 INJECTION, POWDER, LYOPHILIZED, FOR SOLUTION INTRAMUSCULAR; INTRAVENOUS ONCE
Status: CANCELLED | OUTPATIENT
Start: 2021-03-17

## 2020-09-23 RX ADMIN — DENOSUMAB 60 MG: 60 INJECTION SUBCUTANEOUS at 08:58

## 2020-09-23 NOTE — PROGRESS NOTES
Patient ID: Jena Ballard, 1955, [id number], 60 y.o. Diagnosis:   Right-sided DCIS, ER/MS positive diagnosed in July 2016  Treatment history:  Patient had lumpectomy on 8/2/16 which showed positive margins. Patient had reexcision of margins on 8/11/16  As patient had plans to travel to [facility] in October her adjuvant radiation therapy was delayed by radiation oncology social was put on endocrine therapy with tamoxifen  RT started 11/20/16 and completed   Her pelvic ultrasound showed thickened endometrium and she is seen by gynecologist. Patient is worried about the risk of endometrial cancer and wants to switch to Arimidex. Now on arimidex  She has osteoporosis and on Prolia     HISTORY OF PRESENT ILLNESS:    Oncologic History:  Ms Chase Ferrera is a 61-year-old female with no significant past medical history except hyperlipidemia was seen during initial consultation visit for newly diagnosed right sided ductal carcinoma in situ.     Patient had routine screening mammogram which showed calcifications in 2014. This was being closely followed until recently in June the mammogram showed increase in number of calcifications.    Pt had Biopsy on 7/1/16 which showed DCIS.     Her Mother diagnosed at 78 with breast cancer. She never smoked. NO ETOH abuse. 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 She denied any pain, nipple discharge, nipple and sking changes.     INTERVAL HISTORY:  Patient is returning for follow-up visit for her breast cancer. She is tolerating Arimidex very well. She is getting Prolia every 6 months. She denies any new lump or mass skin changes, nipple discharge. She had a recent mammogram which showed no evidence of recurrence. She is active physically and walks 4 miles every day. During this visit patient's allergy, social, medical, surgical history and medications were reviewed and updated.     Patient here for Prolia injection, Calcium is 9.7 today. Allergies   Allergen Reactions    Sulfamethoxazole-Trimethoprim Rash         Current Facility-Administered Medications          Current Outpatient Medications   Medication Sig Dispense Refill    ibuprofen (ADVIL;MOTRIN) 200 MG tablet Take 200 mg by mouth every 6 hours as needed for Pain        anastrozole (ARIMIDEX) 1 MG tablet TAKE 1 TABLET BY MOUTH EVERY DAY 90 tablet 3    alendronate (FOSAMAX) 70 MG tablet TAKE 1 TABLET BY MOUTH ONCE WEEKLY BEFORE BREAKFAST, ON AN EMPTY STOMACH: REMAIN UPRIGHT FOR 30 MINUTES:TAKE WITH 8 OUNCES OF WATER 12 tablet 2    simvastatin (ZOCOR) 20 MG tablet Take 20 mg by mouth daily Indications: Mon/Wed/Fri Mon/Wed/Fri         omeprazole (PRILOSEC OTC) 20 MG tablet Take 20 mg by mouth daily as needed (heartburn)        Probiotic Product (PROBIOTIC DAILY PO) Take 1 tablet by mouth daily        aspirin 81 MG EC tablet Take 81 mg by mouth daily        calcium carbonate 600 MG TABS tablet Take 600 mg by mouth daily         Ascorbic Acid (VITAMIN C) 1000 MG tablet Take 1,000 mg by mouth daily        cetirizine (ZYRTEC ALLERGY) 10 MG tablet Take 10 mg by mouth daily as needed           No current facility-administered medications for this visit.          REVIEW OF SYSTEM:   Constitutional: No fever or chills. No night sweats, no weight loss   Eyes: No eye discharge, double vision, or eye pain   HEENT: negative for sore mouth, sore throat, hoarseness and voice change   Respiratory: negative for cough , sputum, dyspnea, wheezing, hemoptysis, chest pain   Cardiovascular: negative for chest pain, dyspnea, palpitations, orthopnea, PND   Gastrointestinal: negative for nausea, vomiting, diarrhea, constipation, abdominal pain, Dysphagia, hematemesis and hematochezia   Genitourinary: negative for frequency, dysuria, nocturia, urinary incontinence, and hematuria   Integument: negative for rash, skin lesions, bruises.    Hematologic/Lymphatic: negative for easy bruising, bleeding, lymphadenopathy, petechiae and swelling/edema   Endocrine: negative for heat or cold intolerance, tremor, weight changes, change in bowel habits and hair loss   Musculoskeletal: negative for myalgias, arthralgias, pain, joint swelling,and bone pain   Neurological: negative for headaches, dizziness, seizures, weakness, numbness  OBJECTIVE:             Vitals:     09/11/19 0830   BP: (!) 141/92   Pulse: 65   Temp: 98 °F (36.7 °C)      Vital signs were reviewed.     PHYSICAL EXAM:   General appearance - well appearing, no in pain or distress   Mental status - alert and cooperative   Eyes - pupils equal and reactive, extraocular eye movements intact   Mouth - mucous membranes moist, pharynx normal without lesions   Neck - supple, no significant adenopathy   Lymphatics - no palpable lymphadenopathy, no hepatosplenomegaly   Chest - clear to auscultation, no wheezes, rales or rhonchi, symmetric air entry  Status post lumpectomy right side, surgical margin healing well   Heart - normal rate, regular rhythm, normal S1, S2, no murmurs, rubs, clicks or gallops   Abdomen - soft, nontender, nondistended, no masses or organomegaly   Neurological - alert, oriented, normal speech, no focal findings or movement disorder noted   Musculoskeletal - no joint tenderness, deformity or swelling   Extremities - peripheral pulses normal, no pedal edema, no clubbing or cyanosis   Skin - normal coloration and turgor, no rashes, no suspicious skin lesions noted   LABORATORY DATA:            Lab Results   Component Value Date     WBC 5.6 09/09/2019     HGB 12.6 09/09/2019     HCT 39.1 09/09/2019     MCV 93.1 09/09/2019      09/09/2019     LYMPHOPCT 31 09/09/2019     RBC 4.20 09/09/2019     MCH 30.0 09/09/2019     MCHC 32.2 09/09/2019     RDW 12.6 09/09/2019     MONOPCT 7 09/09/2019     BASOPCT 0 09/09/2019     NEUTROABS 3.34 09/09/2019     LYMPHSABS 1.69 09/09/2019     MONOSABS 0.37 09/09/2019     EOSABS 0.08 09/09/2019     BASOSABS <0.03 heterogeneously dense which may   obscure small masses.       BI-RADS 2       BIRADS:   BIRADS - CATEGORY 2       Benign, no evidence of malignancy.  Normal interval follow-up is recommended   in 12 months.       OVERALL ASSESSMENT - BENIGN       A letter of notification will be sent to the patient regarding the results.       The [employer] of Radiology recommends annual mammograms for women 40   years and older.         ASSESSMENT:    Ms Mumtaz Crowe is a 62-year-old female with newly diagnosed, right-sided DCIS, ER positive. She had lumpectomy on 8/2/16. Her radiation therapy was started on 11/20 and completed in jan 2017.  Now on Arimidex and tolerating well. Her bone density scan in July showed osteoporosis which seems to have progressed from her previous DEXA scan which showed osteopenia. She was taking Fosamax. Therefore I will switch her to Prolia and she will continue calcium and vitamin D. She would like to finish the Fosamax pills and then switch to Prolia. We will arrange Prolia with her next visit. I discussed the results of recent mammogram which showed no evidence of recurrence. She will continue annual mammography  She reports epigastric pain and GERD symptoms and she is following with gastroenterology. During today's visit, the patient and the family had a number of reasonable questions which were answered to their satisfaction. They verbalized understanding of the information provided and they agreed to proceed as outlined above.       PLAN:   Continue arimidex  I discussed the results of recent mammogram which showed no evidence of recurrence. We will get a mammogram annually  Next mammogram will be done in September 2021  Advised to take calcium and vitamin D  She reports GERD symptoms and requesting referral to GI.   Plan to start Prolia today and continue every 6-month  Return to clinic in 6 months        I spent more than 25 minutes examining, evaluating, reviewing data and counseling the patient. Dwayne Ndiayes than 50% of that time was spent face-to-face with the patient in counseling and coordinating her care.

## 2020-09-23 NOTE — TELEPHONE ENCOUNTER
Felisha Lanier MD VISIT & INJECTION  DR Ayon Chris IN TO SEE PATIENT  ORDERS RECEIVED  GI REFERRAL  RV 6 MONTHS  CALLED DR GUERIN' OFFICE @ [phone], OFFICE NOT OPEN AT THIS TIME. PATIENT PREFERS TO CALL DUE TO HER WORK SCHEDULE.  PROVIDED PATIENT WITH PHONE NUMBER  MD VISIT 03/24/21 @8:15AM  [email or web address]  AVS PRINTED AND GIVEN TO PATIENT WITH INSTRUCTIONS  PATIENT DISCHARGED TO [address] [place] [address]

## 2020-09-23 NOTE — PROGRESS NOTES
Patient arrive ambulatory from front office having met with physician. Denies complaint or concern; no dental or jaw problems or pain. Labs reviewed. Tolerated Prolia well; band-aid applied. Patient ambulate off unit per self at discharge; AVS provided by front office staff.

## 2020-10-13 ENCOUNTER — TELEPHONE (OUTPATIENT)
Dept: ONCOLOGY | Age: 65
End: 2020-10-13

## 2020-10-13 NOTE — TELEPHONE ENCOUNTER
RECEIV[place] [facility] [place]THA GRUBER FOR RECENT MD NOTES AND MD FOLLOW UP    FAXED TO [phone] [facility]. NOTED ON FORM Yony Eli 68 INFORMATION/ NOTIFIED KARON

## 2021-03-18 ENCOUNTER — HOSPITAL ENCOUNTER (OUTPATIENT)
Facility: MEDICAL CENTER | Age: 66
End: 2021-03-18
Payer: COMMERCIAL

## 2021-03-24 ENCOUNTER — HOSPITAL ENCOUNTER (OUTPATIENT)
Dept: INFUSION THERAPY | Facility: MEDICAL CENTER | Age: 66
Discharge: HOME | End: 2021-03-24
Payer: COMMERCIAL

## 2021-03-24 ENCOUNTER — OFFICE VISIT (OUTPATIENT)
Dept: ONCOLOGY | Age: 66
End: 2021-03-24
Payer: COMMERCIAL

## 2021-03-24 ENCOUNTER — TELEPHONE (OUTPATIENT)
Dept: ONCOLOGY | Age: 66
End: 2021-03-24

## 2021-03-24 ENCOUNTER — HOSPITAL ENCOUNTER (OUTPATIENT)
Facility: MEDICAL CENTER | Age: 66
Discharge: HOME | End: 2021-03-24
Payer: COMMERCIAL

## 2021-03-24 VITALS
HEART RATE: 66 BPM | BODY MASS INDEX: 27.62 KG/M2 | SYSTOLIC BLOOD PRESSURE: 136 MMHG | WEIGHT: 141.4 LBS | TEMPERATURE: 98.5 F | RESPIRATION RATE: 16 BRPM | DIASTOLIC BLOOD PRESSURE: 92 MMHG

## 2021-03-24 DIAGNOSIS — D05.11 DUCTAL CARCINOMA IN SITU (DCIS) OF RIGHT BREAST: ICD-10-CM

## 2021-03-24 DIAGNOSIS — Z12.31 SCREENING MAMMOGRAM, ENCOUNTER FOR: ICD-10-CM

## 2021-03-24 DIAGNOSIS — M81.0 OSTEOPOROSIS WITHOUT CURRENT PATHOLOGICAL FRACTURE, UNSPECIFIED OSTEOPOROSIS TYPE: Primary | ICD-10-CM

## 2021-03-24 DIAGNOSIS — M81.0 OSTEOPOROSIS WITHOUT CURRENT PATHOLOGICAL FRACTURE, UNSPECIFIED OSTEOPOROSIS TYPE: ICD-10-CM

## 2021-03-24 DIAGNOSIS — D05.11 DUCTAL CARCINOMA IN SITU (DCIS) OF RIGHT BREAST: Primary | ICD-10-CM

## 2021-03-24 LAB
ABSOLUTE EOS #: 0.1 K/UL (ref 0–0.44)
ABSOLUTE IMMATURE GRANULOCYTE: 0.03 K/UL (ref 0–0.3)
ABSOLUTE LYMPH #: 1.64 K/UL (ref 1.1–3.7)
ABSOLUTE MONO #: 0.39 K/UL (ref 0.1–1.2)
ALBUMIN SERPL-MCNC: 4.5 G/DL (ref 3.5–5.2)
ALBUMIN/GLOBULIN RATIO: ABNORMAL (ref 1–2.5)
ALP BLD-CCNC: 100 U/L (ref 35–104)
ALT SERPL-CCNC: 11 U/L (ref 5–33)
ANION GAP SERPL CALCULATED.3IONS-SCNC: 11 MMOL/L (ref 9–17)
AST SERPL-CCNC: 19 U/L
BASOPHILS # BLD: 0 % (ref 0–2)
BASOPHILS ABSOLUTE: <0.03 K/UL (ref 0–0.2)
BILIRUB SERPL-MCNC: 0.32 MG/DL (ref 0.3–1.2)
BUN BLDV-MCNC: 12 MG/DL (ref 8–23)
BUN/CREAT BLD: 22 (ref 9–20)
CALCIUM SERPL-MCNC: 9 MG/DL (ref 8.6–10.4)
CHLORIDE BLD-SCNC: 105 MMOL/L (ref 98–107)
CO2: 25 MMOL/L (ref 20–31)
CREAT SERPL-MCNC: 0.54 MG/DL (ref 0.5–0.9)
DIFFERENTIAL TYPE: ABNORMAL
EOSINOPHILS RELATIVE PERCENT: 2 % (ref 1–4)
GFR AFRICAN AMERICAN: >60 ML/MIN
GFR NON-AFRICAN AMERICAN: >60 ML/MIN
GFR SERPL CREATININE-BSD FRML MDRD: ABNORMAL ML/MIN/{1.73_M2}
GFR SERPL CREATININE-BSD FRML MDRD: ABNORMAL ML/MIN/{1.73_M2}
GLUCOSE BLD-MCNC: 117 MG/DL (ref 70–99)
HCT VFR BLD CALC: 37.5 % (ref 36.3–47.1)
HEMOGLOBIN: 11.7 G/DL (ref 11.9–15.1)
IMMATURE GRANULOCYTES: 1 %
LYMPHOCYTES # BLD: 32 % (ref 24–43)
MCH RBC QN AUTO: 30.3 PG (ref 25.2–33.5)
MCHC RBC AUTO-ENTMCNC: 31.2 G/DL (ref 28.4–34.8)
MCV RBC AUTO: 97.2 FL (ref 82.6–102.9)
MONOCYTES # BLD: 8 % (ref 3–12)
NRBC AUTOMATED: 0 PER 100 WBC
PDW BLD-RTO: 12.7 % (ref 11.8–14.4)
PLATELET # BLD: 216 K/UL (ref 138–453)
PLATELET ESTIMATE: ABNORMAL
PMV BLD AUTO: 9.6 FL (ref 8.1–13.5)
POTASSIUM SERPL-SCNC: 4.2 MMOL/L (ref 3.7–5.3)
RBC # BLD: 3.86 M/UL (ref 3.95–5.11)
RBC # BLD: ABNORMAL 10*6/UL
SEG NEUTROPHILS: 57 % (ref 36–65)
SEGMENTED NEUTROPHILS ABSOLUTE COUNT: 3.03 K/UL (ref 1.5–8.1)
SODIUM BLD-SCNC: 141 MMOL/L (ref 135–144)
TOTAL PROTEIN: 7.4 G/DL (ref 6.4–8.3)
WBC # BLD: 5.2 K/UL (ref 3.5–11.3)
WBC # BLD: ABNORMAL 10*3/UL

## 2021-03-24 PROCEDURE — 85025 COMPLETE CBC W/AUTO DIFF WBC: CPT

## 2021-03-24 PROCEDURE — 96401 CHEMO ANTI-NEOPL SQ/IM: CPT

## 2021-03-24 PROCEDURE — 99214 OFFICE O/P EST MOD 30 MIN: CPT | Performed by: INTERNAL MEDICINE

## 2021-03-24 PROCEDURE — 99211 OFF/OP EST MAY X REQ PHY/QHP: CPT | Performed by: INTERNAL MEDICINE

## 2021-03-24 PROCEDURE — 6360000002 HC RX W HCPCS: Performed by: INTERNAL MEDICINE

## 2021-03-24 PROCEDURE — 80053 COMPREHEN METABOLIC PANEL: CPT

## 2021-03-24 PROCEDURE — 36415 COLL VENOUS BLD VENIPUNCTURE: CPT

## 2021-03-24 RX ORDER — ACYCLOVIR 50 MG/G
OINTMENT TOPICAL
Qty: 5 G | Refills: 1 | Status: SHIPPED | OUTPATIENT
Start: 2021-03-24 | End: 2021-03-31

## 2021-03-24 RX ORDER — SODIUM CHLORIDE 9 MG/ML
INJECTION, SOLUTION INTRAVENOUS CONTINUOUS
Status: CANCELLED | OUTPATIENT
Start: 2021-03-24

## 2021-03-24 RX ORDER — METHYLPREDNISOLONE SODIUM SUCCINATE 125 MG/2ML
125 INJECTION, POWDER, LYOPHILIZED, FOR SOLUTION INTRAMUSCULAR; INTRAVENOUS ONCE
Status: CANCELLED | OUTPATIENT
Start: 2021-03-24 | End: 2021-03-24

## 2021-03-24 RX ORDER — SODIUM CHLORIDE 9 MG/ML
INJECTION, SOLUTION INTRAVENOUS CONTINUOUS
Status: CANCELLED | OUTPATIENT
Start: 2021-09-15

## 2021-03-24 RX ORDER — DIPHENHYDRAMINE HYDROCHLORIDE 50 MG/ML
50 INJECTION INTRAMUSCULAR; INTRAVENOUS ONCE
Status: CANCELLED | OUTPATIENT
Start: 2021-03-24 | End: 2021-03-24

## 2021-03-24 RX ORDER — METHYLPREDNISOLONE SODIUM SUCCINATE 125 MG/2ML
125 INJECTION, POWDER, LYOPHILIZED, FOR SOLUTION INTRAMUSCULAR; INTRAVENOUS ONCE
Status: CANCELLED | OUTPATIENT
Start: 2021-09-15 | End: 2021-09-15

## 2021-03-24 RX ORDER — DIPHENHYDRAMINE HYDROCHLORIDE 50 MG/ML
50 INJECTION INTRAMUSCULAR; INTRAVENOUS ONCE
Status: CANCELLED | OUTPATIENT
Start: 2021-09-15 | End: 2021-09-15

## 2021-03-24 RX ADMIN — DENOSUMAB 60 MG: 60 INJECTION SUBCUTANEOUS at 09:13

## 2021-03-24 NOTE — PROGRESS NOTES
Patient ID: Juanjo James, 1955, [id number], 60 y.o. 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 Patient had reexcision of margins on 8/11/16  As patient had plans to travel to [facility] in October her adjuvant radiation therapy was delayed by radiation oncology social was put on endocrine therapy with tamoxifen   RT started 11/20/16 and completed   Her pelvic ultrasound showed thickened endometrium and she is seen by gynecologist. Patient is worried about the risk of endometrial cancer and wants to switch to Arimidex. Now on arimidex  She has osteoporosis and on Prolia     HISTORY OF PRESENT ILLNESS:    Oncologic History:  Ms Darrold Romberg is a 42-year-old female with no significant past medical history except hyperlipidemia was seen during initial consultation visit for newly diagnosed right sided ductal carcinoma in situ.     Patient had routine screening mammogram which showed calcifications in 2014. This was being closely followed until recently in June the mammogram showed increase in number of calcifications.    Pt had Biopsy on 7/1/16 which showed DCIS.     Her Mother diagnosed at 78 with breast cancer. She never smoked. NO ETOH abuse. 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 She denied any pain, nipple discharge, nipple and sking changes.     INTERVAL HISTORY:  Patient is returning for follow-up visit for her breast cancer. She is tolerating Arimidex very well. She has almost completed 5 years from her initial treatment. She is on Prolia every 6 months and tolerating well. She denies any new lump lump in her breast.  She denied any chest pain, shortness of breath. She is active physically and walks 4 miles every day.    During this visit patient's allergy, social, medical, surgical history and medications were reviewed and updated.     Patient here for Prolia for easy bruising, bleeding, lymphadenopathy, petechiae and swelling/edema   Endocrine: negative for heat or cold intolerance, tremor, weight changes, change in bowel habits and hair loss   Musculoskeletal: negative for myalgias, arthralgias, pain, joint swelling,and bone pain   Neurological: negative for headaches, dizziness, seizures, weakness, numbness  OBJECTIVE:             Vitals:     09/11/19 0830   BP: (!) 141/92   Pulse: 65   Temp: 98 °F (36.7 °C)      Vital signs were reviewed.     PHYSICAL EXAM:   General appearance - well appearing, no in pain or distress   Mental status - alert and cooperative   Eyes - pupils equal and reactive, extraocular eye movements intact   Mouth - mucous membranes moist, pharynx normal without lesions   Neck - supple, no significant adenopathy   Lymphatics - no palpable lymphadenopathy, no hepatosplenomegaly   Chest - clear to auscultation, no wheezes, rales or rhonchi, symmetric air entry  Status post lumpectomy right side, surgical margin healing well   Heart - normal rate, regular rhythm, normal S1, S2, no murmurs, rubs, clicks or gallops   Abdomen - soft, nontender, nondistended, no masses or organomegaly   Neurological - alert, oriented, normal speech, no focal findings or movement disorder noted   Musculoskeletal - no joint tenderness, deformity or swelling   Extremities - peripheral pulses normal, no pedal edema, no clubbing or cyanosis   Skin - normal coloration and turgor, no rashes, no suspicious skin lesions noted   LABORATORY DATA:            Lab Results   Component Value Date     WBC 5.6 09/09/2019     HGB 12.6 09/09/2019     HCT 39.1 09/09/2019     MCV 93.1 09/09/2019      09/09/2019     LYMPHOPCT 31 09/09/2019     RBC 4.20 09/09/2019     MCH 30.0 09/09/2019     MCHC 32.2 09/09/2019     RDW 12.6 09/09/2019     MONOPCT 7 09/09/2019     BASOPCT 0 09/09/2019     NEUTROABS 3.34 09/09/2019     LYMPHSABS 1.69 09/09/2019     MONOSABS 0.37 09/09/2019     EOSABS 0.08 09/09/2019     BASOSABS <0.03 09/09/2019           Chemistry               Component Value Date/Time      09/09/2019 0935     K 3.9 09/09/2019 0935      09/09/2019 0935     CO2 27 09/09/2019 0935     BUN 14 09/09/2019 0935     CREATININE 0.54 09/09/2019 0935               Component Value Date/Time     CALCIUM 9.7 09/09/2019 0935     ALKPHOS 81 09/09/2019 0935     AST 17 09/09/2019 0935     ALT 10 09/09/2019 0935     BILITOT 0.38 09/09/2019 0935          PATHOLOGY DATA:   Collected: 7/1/2016   -- Diagnosis --   RIGHT BREAST, 9 O'CLOCK, STEREOTACTIC BIOPSIES:   -  LOW-GRADE DUCTAL CARCINOMA IN SITU, SOLID AND   MICROPAPILLARY/CRIBRIFORM PATTERNS, ER POSITIVE, GA POSITIVE, WITH ASSOCIATED FOCAL MICROCALCIFICATIONS.      Surgical pathology: 8/2/2016   -- Diagnosis --     1.  RIGHT BREAST, RE-EXCISION LUMPECTOMY:      LOW GRADE DUCTAL CARCINOMA IN SITU, SOLID AND  MICROPAPILLARY/CRIBRIFORM PATTERNS.         PREVIOUSLY DETERMINED TO ER POSITIVE AND GA POSITIVE.       THE MEDIAL MARGIN IS POSITIVE FOR DCIS.         THE INFERIOR MARGIN IS FREE BUT CLOSE AT 2 MM.         SEE CHECKLIST FOR DISCUSSION OF MARGINS. 2.  ADDITIONAL SUPERIOR TO DEEP MARGINAL TISSUE:         NEGATIVE FOR ATYPIA AND MALIGNANCY        Surgical pathology: 8/11/2016   -- Diagnosis --   1.  RIGHT BREAST MASS, RE-EXCISION:       -  NEGATIVE FOR RESIDUAL DUCTAL CARCINOMA IN SITU (FINAL MARGINS          NEGATIVE; SEE PREVIOUS SPECIMEN [id number]).     -  FIBROCYSTIC CHANGES WITH DUCTAL PAPILLOMATOSIS AND FOCAL          ADENOSIS. 2.  RIGHT BREAST MASS, ADDITIONAL MEDIAL DEEP MARGIN:       -  NEGATIVE FOR RESIDUAL DUCTAL CARCINOMA IN SITU         -  FIBROCYSTIC CHANGES WITH DUCTAL PAPILLOMATOSIS AND FOCAL          ADENOSIS. IMAGING DATA:    Bilateral screening mammography 9/9/2019  Impression   Stable post treatment changes right breast.  No evidence of malignancy.    Advise annual screening mammography.       BREAST DENSITY SUMMARY C: The breasts are heterogeneously dense which may   obscure small masses.       BI-RADS 2       BIRADS:   BIRADS - CATEGORY 2       Benign, no evidence of malignancy.  Normal interval follow-up is recommended   in 12 months.       OVERALL ASSESSMENT - BENIGN       A letter of notification will be sent to the patient regarding the results.       The [address] [place] [address] of Radiology recommends annual mammograms for women 40   years and older.         ASSESSMENT:    Ms Gutierrez Pratt is a 82-year-old female with newly diagnosed, right-sided DCIS, ER positive. She had lumpectomy on 8/2/16. Her radiation therapy was started on 11/20 and completed in jan 2017.  Now on Arimidex and tolerating well. Her bone density scan in July showed osteoporosis which seems to have progressed from her previous DEXA scan which showed osteopenia. She was taking Fosamax. Therefore I will switch her to Prolia and she will continue calcium and vitamin D. She would like to finish the Fosamax pills and then switch to Prolia. We will arrange Prolia with her next visit. I discussed the results of recent mammogram which showed no evidence of recurrence. She will continue annual mammography  She reports epigastric pain and GERD symptoms and she is following with gastroenterology. During today's visit, the patient and the family had a number of reasonable questions which were answered to their satisfaction. They verbalized understanding of the information provided and they agreed to proceed as outlined above.       PLAN:   I reviewed the diagnosis, discussed the treatment plan with patient   She will continue continue arimidex for another 3 months and then stop  She will continue Prolia every 6 months   I will see her in September with DEXA scan and mammogram prior   She will be followed annually thereafter   Advised to take calcium and vitamin D       I spent more than 25 minutes examining, evaluating, reviewing data and counseling the patient.  Greater than 50% of that time was spent face-to-face with the patient in counseling and coordinating her care.

## 2021-03-24 NOTE — TELEPHONE ENCOUNTER
Tu Olmos MD VISIT & INJECTION  DR Cory David IN TO SEE PATIENT  ORDERS RECEIVED  RN IN November W/DEXA & MAMMOGRAM PRIOR  PROLIA EVERY 6 MONTHS  FORM GIVEN TO PATIENT TO SCHEDULE MAMMOGRAM & BONE SCAN PRIOR TO RV  PT WOULD LIKE TO SEE DR Quintin Avendaño MD VISIT 9/22/21 @9:15AM  [email or web address]  LABS CDP CMP 9/22/21  SCRIPT CALLED INTO PATIENTS PHARMACY  AVS PRINTED AND GIVEN TO PATIENT WITH INSTRUCTIONS  PATIENT DISCHARGED TO [address] [place] [address]

## 2021-03-24 NOTE — PROGRESS NOTES
Patient here following MD visit for Prolia injection. No complaints of jaw pain or dental issues. Labs reviewed. Prolia given per [facility], band aid to site. Has a return visit scheduled. Discharged ambulatory per self.

## 2021-05-05 ENCOUNTER — TELEPHONE (OUTPATIENT)
Dept: ONCOLOGY | Age: 66
End: 2021-05-05

## 2021-05-05 NOTE — TELEPHONE ENCOUNTER
RECEIVED A FAXED REQUEST FROM [facility] FROM ONCOLOGY  Sujit Natarajan. REQUEST UPDATED OFFICE NOTES AND DIAGNOSTIC TESTS FROM 03/02/21 TO PRESENT.     TREATMENT PLAN FOR PROLIA  DUE 09/22/21 AT TIME OF NEXT MD EXAM    MAMMOGRAM AND DEXA SCAN 09/13/21    FAXED W/ CONFIRMATION TO 1 [phone]

## 2021-09-13 ENCOUNTER — HOSPITAL ENCOUNTER (OUTPATIENT)
Dept: MAMMOGRAPHY | Age: 66
Discharge: HOME | End: 2021-09-15
Payer: COMMERCIAL

## 2021-09-13 DIAGNOSIS — M81.0 OSTEOPOROSIS WITHOUT CURRENT PATHOLOGICAL FRACTURE, UNSPECIFIED OSTEOPOROSIS TYPE: ICD-10-CM

## 2021-09-13 DIAGNOSIS — Z12.31 SCREENING MAMMOGRAM, ENCOUNTER FOR: ICD-10-CM

## 2021-09-13 PROCEDURE — 77080 DXA BONE DENSITY AXIAL: CPT

## 2021-09-13 PROCEDURE — 77067 SCR MAMMO BI INCL CAD: CPT

## 2021-09-22 ENCOUNTER — OFFICE VISIT (OUTPATIENT)
Dept: ONCOLOGY | Age: 66
End: 2021-09-22
Payer: COMMERCIAL

## 2021-09-22 ENCOUNTER — HOSPITAL ENCOUNTER (OUTPATIENT)
Facility: MEDICAL CENTER | Age: 66
Discharge: HOME | End: 2021-09-22
Payer: COMMERCIAL

## 2021-09-22 ENCOUNTER — HOSPITAL ENCOUNTER (OUTPATIENT)
Dept: INFUSION THERAPY | Facility: MEDICAL CENTER | Age: 66
Discharge: HOME | End: 2021-09-22
Payer: COMMERCIAL

## 2021-09-22 ENCOUNTER — TELEPHONE (OUTPATIENT)
Dept: ONCOLOGY | Age: 66
End: 2021-09-22

## 2021-09-22 VITALS
WEIGHT: 137 LBS | SYSTOLIC BLOOD PRESSURE: 129 MMHG | TEMPERATURE: 98.5 F | HEART RATE: 62 BPM | RESPIRATION RATE: 16 BRPM | DIASTOLIC BLOOD PRESSURE: 82 MMHG | BODY MASS INDEX: 26.76 KG/M2

## 2021-09-22 DIAGNOSIS — D05.11 DUCTAL CARCINOMA IN SITU (DCIS) OF RIGHT BREAST: ICD-10-CM

## 2021-09-22 DIAGNOSIS — M81.0 OSTEOPOROSIS WITHOUT CURRENT PATHOLOGICAL FRACTURE, UNSPECIFIED OSTEOPOROSIS TYPE: Primary | ICD-10-CM

## 2021-09-22 DIAGNOSIS — D05.11 DUCTAL CARCINOMA IN SITU (DCIS) OF RIGHT BREAST: Primary | ICD-10-CM

## 2021-09-22 DIAGNOSIS — Z12.31 SCREENING MAMMOGRAM, ENCOUNTER FOR: Primary | ICD-10-CM

## 2021-09-22 LAB
ABSOLUTE EOS #: 0.07 K/UL (ref 0–0.44)
ABSOLUTE IMMATURE GRANULOCYTE: 0.02 K/UL (ref 0–0.3)
ABSOLUTE LYMPH #: 1.88 K/UL (ref 1.1–3.7)
ABSOLUTE MONO #: 0.42 K/UL (ref 0.1–1.2)
ALBUMIN SERPL-MCNC: 4.6 G/DL (ref 3.5–5.2)
ALBUMIN/GLOBULIN RATIO: ABNORMAL (ref 1–2.5)
ALP BLD-CCNC: 82 U/L (ref 35–104)
ALT SERPL-CCNC: 10 U/L (ref 5–33)
ANION GAP SERPL CALCULATED.3IONS-SCNC: 8 MMOL/L (ref 9–17)
AST SERPL-CCNC: 16 U/L
BASOPHILS # BLD: 0 % (ref 0–2)
BASOPHILS ABSOLUTE: <0.03 K/UL (ref 0–0.2)
BILIRUB SERPL-MCNC: 0.45 MG/DL (ref 0.3–1.2)
BUN BLDV-MCNC: 18 MG/DL (ref 8–23)
BUN/CREAT BLD: 33 (ref 9–20)
CALCIUM SERPL-MCNC: 9.3 MG/DL (ref 8.6–10.4)
CHLORIDE BLD-SCNC: 103 MMOL/L (ref 98–107)
CO2: 29 MMOL/L (ref 20–31)
CREAT SERPL-MCNC: 0.54 MG/DL (ref 0.5–0.9)
DIFFERENTIAL TYPE: ABNORMAL
EOSINOPHILS RELATIVE PERCENT: 1 % (ref 1–4)
GFR AFRICAN AMERICAN: >60 ML/MIN
GFR NON-AFRICAN AMERICAN: >60 ML/MIN
GFR SERPL CREATININE-BSD FRML MDRD: ABNORMAL ML/MIN/{1.73_M2}
GFR SERPL CREATININE-BSD FRML MDRD: ABNORMAL ML/MIN/{1.73_M2}
GLUCOSE BLD-MCNC: 106 MG/DL (ref 70–99)
HCT VFR BLD CALC: 36.9 % (ref 36.3–47.1)
HEMOGLOBIN: 11.7 G/DL (ref 11.9–15.1)
IMMATURE GRANULOCYTES: 0 %
LYMPHOCYTES # BLD: 36 % (ref 24–43)
MCH RBC QN AUTO: 30 PG (ref 25.2–33.5)
MCHC RBC AUTO-ENTMCNC: 31.7 G/DL (ref 28.4–34.8)
MCV RBC AUTO: 94.6 FL (ref 82.6–102.9)
MONOCYTES # BLD: 8 % (ref 3–12)
NRBC AUTOMATED: 0 PER 100 WBC
PDW BLD-RTO: 12.5 % (ref 11.8–14.4)
PLATELET # BLD: 195 K/UL (ref 138–453)
PLATELET ESTIMATE: ABNORMAL
PMV BLD AUTO: 10.3 FL (ref 8.1–13.5)
POTASSIUM SERPL-SCNC: 4.2 MMOL/L (ref 3.7–5.3)
RBC # BLD: 3.9 M/UL (ref 3.95–5.11)
RBC # BLD: ABNORMAL 10*6/UL
SEG NEUTROPHILS: 55 % (ref 36–65)
SEGMENTED NEUTROPHILS ABSOLUTE COUNT: 2.86 K/UL (ref 1.5–8.1)
SODIUM BLD-SCNC: 140 MMOL/L (ref 135–144)
TOTAL PROTEIN: 7.6 G/DL (ref 6.4–8.3)
WBC # BLD: 5.3 K/UL (ref 3.5–11.3)
WBC # BLD: ABNORMAL 10*3/UL

## 2021-09-22 PROCEDURE — 96372 THER/PROPH/DIAG INJ SC/IM: CPT

## 2021-09-22 PROCEDURE — 99214 OFFICE O/P EST MOD 30 MIN: CPT | Performed by: INTERNAL MEDICINE

## 2021-09-22 PROCEDURE — 36415 COLL VENOUS BLD VENIPUNCTURE: CPT

## 2021-09-22 PROCEDURE — 85025 COMPLETE CBC W/AUTO DIFF WBC: CPT

## 2021-09-22 PROCEDURE — 99211 OFF/OP EST MAY X REQ PHY/QHP: CPT | Performed by: INTERNAL MEDICINE

## 2021-09-22 PROCEDURE — 6360000002 HC RX W HCPCS: Performed by: INTERNAL MEDICINE

## 2021-09-22 PROCEDURE — 80053 COMPREHEN METABOLIC PANEL: CPT

## 2021-09-22 RX ORDER — METHYLPREDNISOLONE SODIUM SUCCINATE 125 MG/2ML
125 INJECTION, POWDER, LYOPHILIZED, FOR SOLUTION INTRAMUSCULAR; INTRAVENOUS ONCE
OUTPATIENT
Start: 2022-03-16 | End: 2022-03-16

## 2021-09-22 RX ORDER — DIPHENHYDRAMINE HYDROCHLORIDE 50 MG/ML
50 INJECTION INTRAMUSCULAR; INTRAVENOUS ONCE
OUTPATIENT
Start: 2022-03-16 | End: 2022-03-16

## 2021-09-22 RX ORDER — SODIUM CHLORIDE 9 MG/ML
INJECTION, SOLUTION INTRAVENOUS CONTINUOUS
OUTPATIENT
Start: 2022-03-16

## 2021-09-22 RX ORDER — ALENDRONATE SODIUM 70 MG/1
70 TABLET ORAL
Qty: 12 TABLET | Refills: 5 | Status: SHIPPED | OUTPATIENT
Start: 2021-09-22 | End: 2022-10-07

## 2021-09-22 RX ADMIN — DENOSUMAB 60 MG: 60 INJECTION SUBCUTANEOUS at 10:13

## 2021-09-22 NOTE — TELEPHONE ENCOUNTER
Paresh Dan MD VISIT  PROLIA TODAY AND THEN STOP  RV 1 YR  THE LAST PROLIA INJECTION WAS DONE ON 9/22/21  PT HAS A MAMMOGRAM TO SCHEDULE SHE WILL CALL AND HAVE IT SCHEDULED BEFORE RV IN 9/2022  MD VISIT 9/21/22 @ 9AM  AVS PRINTED W/ INSTRUCTIONS AND GIVEN TO PT ON EXIT

## 2021-09-22 NOTE — PROGRESS NOTES
Patient arrived ambulatory per self for Prolia injection after physician visit at front office. Patient denies complaints or concerns. Denies jaw or dental issues. Labs reviewed. Prolia injection given with band aide applied to site. Patient ambulated off unit per self at discharge.

## 2021-09-22 NOTE — PROGRESS NOTES
Allergen Reactions    Sulfamethoxazole-Trimethoprim Rash         Current Facility-Administered Medications          Current Outpatient Medications   Medication Sig Dispense Refill    ibuprofen (ADVIL;MOTRIN) 200 MG tablet Take 200 mg by mouth every 6 hours as needed for Pain        anastrozole (ARIMIDEX) 1 MG tablet TAKE 1 TABLET BY MOUTH EVERY DAY 90 tablet 3    alendronate (FOSAMAX) 70 MG tablet TAKE 1 TABLET BY MOUTH ONCE WEEKLY BEFORE BREAKFAST, ON AN EMPTY STOMACH: REMAIN UPRIGHT FOR 30 MINUTES:TAKE WITH 8 OUNCES OF WATER 12 tablet 2    simvastatin (ZOCOR) 20 MG tablet Take 20 mg by mouth daily Indications: Mon/Wed/Fri Mon/Wed/Fri         omeprazole (PRILOSEC OTC) 20 MG tablet Take 20 mg by mouth daily as needed (heartburn)        Probiotic Product (PROBIOTIC DAILY PO) Take 1 tablet by mouth daily        aspirin 81 MG EC tablet Take 81 mg by mouth daily        calcium carbonate 600 MG TABS tablet Take 600 mg by mouth daily         Ascorbic Acid (VITAMIN C) 1000 MG tablet Take 1,000 mg by mouth daily        cetirizine (ZYRTEC ALLERGY) 10 MG tablet Take 10 mg by mouth daily as needed           No current facility-administered medications for this visit.          REVIEW OF SYSTEM:   Constitutional: No fever or chills. No night sweats, no weight loss   Eyes: No eye discharge, double vision, or eye pain   HEENT: negative for sore mouth, sore throat, hoarseness and voice change   Respiratory: negative for cough , sputum, dyspnea, wheezing, hemoptysis, chest pain   Cardiovascular: negative for chest pain, dyspnea, palpitations, orthopnea, PND   Gastrointestinal: negative for nausea, vomiting, diarrhea, constipation, abdominal pain, Dysphagia, hematemesis and hematochezia   Genitourinary: negative for frequency, dysuria, nocturia, urinary incontinence, and hematuria   Integument: negative for rash, skin lesions, bruises.    Hematologic/Lymphatic: negative for easy bruising, bleeding, lymphadenopathy, petechiae and swelling/edema   Endocrine: negative for heat or cold intolerance, tremor, weight changes, change in bowel habits and hair loss   Musculoskeletal: negative for myalgias, arthralgias, pain, joint swelling,and bone pain   Neurological: negative for headaches, dizziness, seizures, weakness, numbness  OBJECTIVE:             Vitals:     09/11/19 0830   BP: (!) 141/92   Pulse: 65   Temp: 98 °F (36.7 °C)      Vital signs were reviewed.     PHYSICAL EXAM:   General appearance - well appearing, no in pain or distress   Mental status - alert and cooperative   Eyes - pupils equal and reactive, extraocular eye movements intact   Mouth - mucous membranes moist, pharynx normal without lesions   Neck - supple, no significant adenopathy   Lymphatics - no palpable lymphadenopathy, no hepatosplenomegaly   Chest - clear to auscultation, no wheezes, rales or rhonchi, symmetric air entry  Status post lumpectomy right side, surgical margin healing well   Heart - normal rate, regular rhythm, normal S1, S2, no murmurs, rubs, clicks or gallops   Abdomen - soft, nontender, nondistended, no masses or organomegaly   Neurological - alert, oriented, normal speech, no focal findings or movement disorder noted   Musculoskeletal - no joint tenderness, deformity or swelling   Extremities - peripheral pulses normal, no pedal edema, no clubbing or cyanosis   Skin - normal coloration and turgor, no rashes, no suspicious skin lesions noted   LABORATORY DATA:            Lab Results   Component Value Date     WBC 5.6 09/09/2019     HGB 12.6 09/09/2019     HCT 39.1 09/09/2019     MCV 93.1 09/09/2019      09/09/2019     LYMPHOPCT 31 09/09/2019     RBC 4.20 09/09/2019     MCH 30.0 09/09/2019     MCHC 32.2 09/09/2019     RDW 12.6 09/09/2019     MONOPCT 7 09/09/2019     BASOPCT 0 09/09/2019     NEUTROABS 3.34 09/09/2019     LYMPHSABS 1.69 09/09/2019     MONOSABS 0.37 09/09/2019     EOSABS 0.08 09/09/2019     BASOSABS <0.03 09/09/2019         Chemistry               Component Value Date/Time      09/09/2019 0935     K 3.9 09/09/2019 0935      09/09/2019 0935     CO2 27 09/09/2019 0935     BUN 14 09/09/2019 0935     CREATININE 0.54 09/09/2019 0935               Component Value Date/Time     CALCIUM 9.7 09/09/2019 0935     ALKPHOS 81 09/09/2019 0935     AST 17 09/09/2019 0935     ALT 10 09/09/2019 0935     BILITOT 0.38 09/09/2019 0935          PATHOLOGY DATA:   Collected: 7/1/2016   -- Diagnosis --   RIGHT BREAST, 9 O'CLOCK, STEREOTACTIC BIOPSIES:   -  LOW-GRADE DUCTAL CARCINOMA IN SITU, SOLID AND   MICROPAPILLARY/CRIBRIFORM PATTERNS, ER POSITIVE, DC POSITIVE, WITH ASSOCIATED FOCAL MICROCALCIFICATIONS.      Surgical pathology: 8/2/2016   -- Diagnosis --     1.  RIGHT BREAST, RE-EXCISION LUMPECTOMY:      LOW GRADE DUCTAL CARCINOMA IN SITU, SOLID AND  MICROPAPILLARY/CRIBRIFORM PATTERNS.         PREVIOUSLY DETERMINED TO ER POSITIVE AND DC POSITIVE.       THE MEDIAL MARGIN IS POSITIVE FOR DCIS.         THE INFERIOR MARGIN IS FREE BUT CLOSE AT 2 MM.         SEE CHECKLIST FOR DISCUSSION OF MARGINS. 2.  ADDITIONAL SUPERIOR TO DEEP MARGINAL TISSUE:         NEGATIVE FOR ATYPIA AND MALIGNANCY        Surgical pathology: 8/11/2016   -- Diagnosis --   1.  RIGHT BREAST MASS, RE-EXCISION:       -  NEGATIVE FOR RESIDUAL DUCTAL CARCINOMA IN SITU (FINAL MARGINS          NEGATIVE; SEE PREVIOUS SPECIMEN [id number]).     -  FIBROCYSTIC CHANGES WITH DUCTAL PAPILLOMATOSIS AND FOCAL          ADENOSIS. 2.  RIGHT BREAST MASS, ADDITIONAL MEDIAL DEEP MARGIN:       -  NEGATIVE FOR RESIDUAL DUCTAL CARCINOMA IN SITU         -  FIBROCYSTIC CHANGES WITH DUCTAL PAPILLOMATOSIS AND FOCAL          ADENOSIS. IMAGING DATA:    Bilateral screening mammography 9/9/2019  Impression   Stable post treatment changes right breast.  No evidence of malignancy.    Advise annual screening mammography.       BREAST DENSITY SUMMARY C: The breasts are heterogeneously dense which may   obscure small masses.       BI-RADS 2       BIRADS:   BIRADS - CATEGORY 2       Benign, no evidence of malignancy.  Normal interval follow-up is recommended   in 12 months.       OVERALL ASSESSMENT - BENIGN       A letter of notification will be sent to the patient regarding the results.       The [address] [place] [address] of Radiology recommends annual mammograms for women 40   years and older.         ASSESSMENT:    Ms Aston Howell is a 61 y.o.  female with newly diagnosed, right-sided DCIS, ER positive. She had lumpectomy on 8/2/16. Her radiation therapy was started on 11/20 and completed in jan 2017.  She has completed 5 days of endocrine therapy. Her recent bone density scan showing osteopenia. During today's visit, the patient and the family had a number of reasonable questions which were answered to their satisfaction. They verbalized understanding of the information provided and they agreed to proceed as outlined above.       PLAN:   I reviewed the recent lab work, imaging studies and discussed the treatment recommendations   She completed 5/endocrine therapy   Recent DEXA scan showing osteopenia. She will receive Prolia today and then subsequently will stop the Prolia and change her to Fosamax   She will continue vitamin D and calcium supplements   We will follow her annually with annual mammogram          I spent more than 25 minutes examining, evaluating, reviewing data and counseling the patient. Nitin Whitey than 50% of that time was spent face-to-face with the patient in counseling and coordinating her care.

## 2022-09-13 ENCOUNTER — HOSPITAL ENCOUNTER (OUTPATIENT)
Dept: MAMMOGRAPHY | Age: 67
Discharge: HOME | End: 2022-09-15
Payer: COMMERCIAL

## 2022-09-13 DIAGNOSIS — Z12.31 SCREENING MAMMOGRAM, ENCOUNTER FOR: ICD-10-CM

## 2022-09-13 PROCEDURE — 77063 BREAST TOMOSYNTHESIS BI: CPT

## 2022-09-21 ENCOUNTER — TELEPHONE (OUTPATIENT)
Dept: ONCOLOGY | Age: 67
End: 2022-09-21

## 2022-09-21 ENCOUNTER — OFFICE VISIT (OUTPATIENT)
Dept: ONCOLOGY | Age: 67
End: 2022-09-21
Payer: COMMERCIAL

## 2022-09-21 VITALS
SYSTOLIC BLOOD PRESSURE: 143 MMHG | RESPIRATION RATE: 16 BRPM | TEMPERATURE: 97.6 F | WEIGHT: 140.2 LBS | HEART RATE: 63 BPM | DIASTOLIC BLOOD PRESSURE: 87 MMHG | BODY MASS INDEX: 27.38 KG/M2

## 2022-09-21 DIAGNOSIS — Z12.31 SCREENING MAMMOGRAM, ENCOUNTER FOR: ICD-10-CM

## 2022-09-21 DIAGNOSIS — D05.11 DUCTAL CARCINOMA IN SITU (DCIS) OF RIGHT BREAST: Primary | ICD-10-CM

## 2022-09-21 PROCEDURE — 1123F ACP DISCUSS/DSCN MKR DOCD: CPT | Performed by: INTERNAL MEDICINE

## 2022-09-21 PROCEDURE — 99214 OFFICE O/P EST MOD 30 MIN: CPT | Performed by: INTERNAL MEDICINE

## 2022-09-21 PROCEDURE — 99211 OFF/OP EST MAY X REQ PHY/QHP: CPT | Performed by: INTERNAL MEDICINE

## 2022-09-21 NOTE — TELEPHONE ENCOUNTER
Oliver Beal MD VISIT  DR Justen Wells IN TO SEE PATIENT  ORDERS RECEIVED  RV 1 YEAR   MAMMOGRAM BEFORE  PT TO SCHEDULE MAMMOGRAM PRIOR TO 09/20/23, Rubén Lowe MD VISIT 09/20/23 @9AM  AVS PRINTED AND GIVEN TO PATIENT WITH INSTRUCTIONS  PATIENT DISCHARGED AMBULATORY

## 2022-09-21 NOTE — PROGRESS NOTES
Patient ID: Kadie Woody, 1955, [id number], 60 y.o. 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 Patient had reexcision of margins on 8/11/16  As patient had plans to travel to [facility] in October her adjuvant radiation therapy was delayed by radiation oncology social was put on endocrine therapy with tamoxifen   RT started 11/20/16 and completed   Her pelvic ultrasound showed thickened endometrium and she is seen by gynecologist. Patient is worried about the risk of endometrial cancer and wants to switch to Arimidex. Now on arimidex  She has osteoporosis and on Prolia     HISTORY OF PRESENT ILLNESS:    Oncologic History:  Ms Kunal Plummer is a 51-year-old female with no significant past medical history except hyperlipidemia was seen during initial consultation visit for newly diagnosed right sided ductal carcinoma in situ. Patient had routine screening mammogram which showed calcifications in 2014. This was being closely followed until recently in June the mammogram showed increase in number of calcifications. Pt had Biopsy on 7/1/16 which showed DCIS. Her Mother diagnosed at 78 with breast cancer. She never smoked. NO ETOH abuse. 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 She denied any pain, nipple discharge, nipple and sking changes. INTERVAL HISTORY:  Patient is follow-up visit and to discuss mammogram results and further recommendations. She denies any new lump, mass in her breast.  She denies any nipple discharge or skin changes she denies any chest pain shortness of breath. She denies any new bone pain back pain. During this visit patient's allergy, social, medical, surgical history and medications were reviewed and updated.          Allergies   Allergen Reactions    Sulfamethoxazole-Trimethoprim Rash         Current Facility-Administered Medications Current Outpatient Medications   Medication Sig Dispense Refill    ibuprofen (ADVIL;MOTRIN) 200 MG tablet Take 200 mg by mouth every 6 hours as needed for Pain        anastrozole (ARIMIDEX) 1 MG tablet TAKE 1 TABLET BY MOUTH EVERY DAY 90 tablet 3    alendronate (FOSAMAX) 70 MG tablet TAKE 1 TABLET BY MOUTH ONCE WEEKLY BEFORE BREAKFAST, ON AN EMPTY STOMACH: REMAIN UPRIGHT FOR 30 MINUTES:TAKE WITH 8 OUNCES OF WATER 12 tablet 2    simvastatin (ZOCOR) 20 MG tablet Take 20 mg by mouth daily Indications: Mon/Wed/Fri Mon/Wed/Fri         omeprazole (PRILOSEC OTC) 20 MG tablet Take 20 mg by mouth daily as needed (heartburn)        Probiotic Product (PROBIOTIC DAILY PO) Take 1 tablet by mouth daily        aspirin 81 MG EC tablet Take 81 mg by mouth daily        calcium carbonate 600 MG TABS tablet Take 600 mg by mouth daily         Ascorbic Acid (VITAMIN C) 1000 MG tablet Take 1,000 mg by mouth daily        cetirizine (ZYRTEC ALLERGY) 10 MG tablet Take 10 mg by mouth daily as needed           No current facility-administered medications for this visit. REVIEW OF SYSTEM:   Constitutional: No fever or chills. No night sweats, no weight loss   Eyes: No eye discharge, double vision, or eye pain   HEENT: negative for sore mouth, sore throat, hoarseness and voice change   Respiratory: negative for cough , sputum, dyspnea, wheezing, hemoptysis, chest pain   Cardiovascular: negative for chest pain, dyspnea, palpitations, orthopnea, PND   Gastrointestinal: negative for nausea, vomiting, diarrhea, constipation, abdominal pain, Dysphagia, hematemesis and hematochezia   Genitourinary: negative for frequency, dysuria, nocturia, urinary incontinence, and hematuria   Integument: negative for rash, skin lesions, bruises.    Hematologic/Lymphatic: negative for easy bruising, bleeding, lymphadenopathy, petechiae and swelling/edema   Endocrine: negative for heat or cold intolerance, tremor, weight changes, change in bowel habits and hair loss   Musculoskeletal: negative for myalgias, arthralgias, pain, joint swelling,and bone pain   Neurological: negative for headaches, dizziness, seizures, weakness, numbness  OBJECTIVE:             Vitals:     09/11/19 0830   BP: (!) 141/92   Pulse: 65   Temp: 98 °F (36.7 °C)      Vital signs were reviewed.      PHYSICAL EXAM:   General appearance - well appearing, no in pain or distress   Mental status - alert and cooperative   Eyes - pupils equal and reactive, extraocular eye movements intact   Mouth - mucous membranes moist, pharynx normal without lesions   Neck - supple, no significant adenopathy   Lymphatics - no palpable lymphadenopathy, no hepatosplenomegaly   Chest - clear to auscultation, no wheezes, rales or rhonchi, symmetric air entry  Status post lumpectomy right side, surgical margin healing well   Heart - normal rate, regular rhythm, normal S1, S2, no murmurs, rubs, clicks or gallops   Abdomen - soft, nontender, nondistended, no masses or organomegaly   Neurological - alert, oriented, normal speech, no focal findings or movement disorder noted   Musculoskeletal - no joint tenderness, deformity or swelling   Extremities - peripheral pulses normal, no pedal edema, no clubbing or cyanosis   Skin - normal coloration and turgor, no rashes, no suspicious skin lesions noted   LABORATORY DATA:            Lab Results   Component Value Date     WBC 5.6 09/09/2019     HGB 12.6 09/09/2019     HCT 39.1 09/09/2019     MCV 93.1 09/09/2019      09/09/2019     LYMPHOPCT 31 09/09/2019     RBC 4.20 09/09/2019     MCH 30.0 09/09/2019     MCHC 32.2 09/09/2019     RDW 12.6 09/09/2019     MONOPCT 7 09/09/2019     BASOPCT 0 09/09/2019     NEUTROABS 3.34 09/09/2019     LYMPHSABS 1.69 09/09/2019     MONOSABS 0.37 09/09/2019     EOSABS 0.08 09/09/2019     BASOSABS <0.03 09/09/2019           Chemistry               Component Value Date/Time      09/09/2019 0935     K 3.9 09/09/2019 0935      09/09/2019 0935     CO2 27 09/09/2019 0935     BUN 14 09/09/2019 0935     CREATININE 0.54 09/09/2019 0935               Component Value Date/Time     CALCIUM 9.7 09/09/2019 0935     ALKPHOS 81 09/09/2019 0935     AST 17 09/09/2019 0935     ALT 10 09/09/2019 0935     BILITOT 0.38 09/09/2019 0935          PATHOLOGY DATA:   Collected: 7/1/2016   -- Diagnosis --   RIGHT BREAST, 9 O'CLOCK, STEREOTACTIC BIOPSIES:   -  LOW-GRADE DUCTAL CARCINOMA IN SITU, SOLID AND   MICROPAPILLARY/CRIBRIFORM PATTERNS, ER POSITIVE, NJ POSITIVE, WITH ASSOCIATED FOCAL MICROCALCIFICATIONS. Surgical pathology: 8/2/2016   -- Diagnosis --     1.  RIGHT BREAST, RE-EXCISION LUMPECTOMY:      LOW GRADE DUCTAL CARCINOMA IN SITU, SOLID AND  MICROPAPILLARY/CRIBRIFORM PATTERNS. PREVIOUSLY DETERMINED TO ER POSITIVE AND NJ POSITIVE. THE MEDIAL MARGIN IS POSITIVE FOR DCIS. THE INFERIOR MARGIN IS FREE BUT CLOSE AT 2 MM. SEE CHECKLIST FOR DISCUSSION OF MARGINS. 2.  ADDITIONAL SUPERIOR TO DEEP MARGINAL TISSUE:         NEGATIVE FOR ATYPIA AND MALIGNANCY        Surgical pathology: 8/11/2016   -- Diagnosis --   1.  RIGHT BREAST MASS, RE-EXCISION:       -  NEGATIVE FOR RESIDUAL DUCTAL CARCINOMA IN SITU (FINAL MARGINS          NEGATIVE; SEE PREVIOUS SPECIMEN [id number]). -  FIBROCYSTIC CHANGES WITH DUCTAL PAPILLOMATOSIS AND FOCAL          ADENOSIS. 2.  RIGHT BREAST MASS, ADDITIONAL MEDIAL DEEP MARGIN:       -  NEGATIVE FOR RESIDUAL DUCTAL CARCINOMA IN SITU         -  FIBROCYSTIC CHANGES WITH DUCTAL PAPILLOMATOSIS AND FOCAL          ADENOSIS. IMAGING DATA:    Bilateral screening mammography 9/9/2019  Impression   Stable post treatment changes right breast.  No evidence of malignancy. Advise annual screening mammography. BREAST DENSITY SUMMARY C: The breasts are heterogeneously dense which may   obscure small masses. BI-RADS 2       BIRADS:   BIRADS - CATEGORY 2       Benign, no evidence of malignancy. Normal interval follow-up is recommended   in 12 months. OVERALL ASSESSMENT - BENIGN       A letter of notification will be sent to the patient regarding the results. The [employer] of [employer] recommends annual mammograms for women 40   years and older. ASSESSMENT:    Ms Samira Vega is a 74 y.o.  female with right-sided DCIS, ER positive. She had lumpectomy on 8/2/16. Her radiation therapy was started on 11/20 and completed in jan 2017. She has completed 5 days of endocrine therapy. Her recent bone density scan showing osteopenia. During today's visit, the patient and the family had a number of reasonable questions which were answered to their satisfaction. They verbalized understanding of the information provided and they agreed to proceed as outlined above. PLAN:   I reviewed recent labs Imaging and discussed diagnosis and treatment recommendations  Recent mammogram showed no evidence of recurrence  Follow up as per NCCN guidelines  RTC in one year [facility] mammogram         I spent more than 35 minutes examining, evaluating, reviewing data and counseling the patient. Greater than 50% of that time was spent face-to-face with the patient in counseling and coordinating her care.

## 2022-10-07 RX ORDER — ALENDRONATE SODIUM 70 MG/1
TABLET ORAL
Qty: 4 TABLET | Refills: 17 | Status: SHIPPED | OUTPATIENT
Start: 2022-10-07

## 2023-09-19 ENCOUNTER — HOSPITAL ENCOUNTER (OUTPATIENT)
Dept: MAMMOGRAPHY | Age: 68
Discharge: HOME | End: 2023-09-21
Payer: COMMERCIAL

## 2023-09-19 DIAGNOSIS — Z12.31 SCREENING MAMMOGRAM, ENCOUNTER FOR: ICD-10-CM

## 2023-09-19 PROCEDURE — 77063 BREAST TOMOSYNTHESIS BI: CPT

## 2023-09-20 ENCOUNTER — OFFICE VISIT (OUTPATIENT)
Dept: ONCOLOGY | Age: 68
End: 2023-09-20
Payer: COMMERCIAL

## 2023-09-20 ENCOUNTER — TELEPHONE (OUTPATIENT)
Dept: ONCOLOGY | Age: 68
End: 2023-09-20

## 2023-09-20 VITALS
DIASTOLIC BLOOD PRESSURE: 78 MMHG | WEIGHT: 136.3 LBS | RESPIRATION RATE: 16 BRPM | SYSTOLIC BLOOD PRESSURE: 125 MMHG | HEART RATE: 65 BPM | TEMPERATURE: 98.3 F | BODY MASS INDEX: 26.62 KG/M2

## 2023-09-20 DIAGNOSIS — Z79.811 AROMATASE INHIBITOR USE: ICD-10-CM

## 2023-09-20 DIAGNOSIS — D05.11 DUCTAL CARCINOMA IN SITU (DCIS) OF RIGHT BREAST: Primary | ICD-10-CM

## 2023-09-20 DIAGNOSIS — Z12.31 SCREENING MAMMOGRAM, ENCOUNTER FOR: ICD-10-CM

## 2023-09-20 PROCEDURE — 99214 OFFICE O/P EST MOD 30 MIN: CPT | Performed by: INTERNAL MEDICINE

## 2023-09-20 PROCEDURE — 1123F ACP DISCUSS/DSCN MKR DOCD: CPT | Performed by: INTERNAL MEDICINE

## 2023-09-20 PROCEDURE — 99211 OFF/OP EST MAY X REQ PHY/QHP: CPT | Performed by: INTERNAL MEDICINE

## 2023-09-20 NOTE — PROGRESS NOTES
Patient ID: Stacey Dewitt, 1955, [id number], 60 y.o. Diagnosis:   Right-sided DCIS, ER/GA positive diagnosed in July 2016  Treatment history:  Patient had lumpectomy on 8/2/16 which showed positive margins. Patient had reexcision of margins on 8/11/16  As patient had plans to travel to [place] in October her adjuvant radiation therapy was delayed by radiation oncology social was put on endocrine therapy with tamoxifen   RT started 11/20/16 and completed   Her pelvic ultrasound showed thickened endometrium and she is seen by gynecologist. Patient is worried about the risk of endometrial cancer and wants to switch to Arimidex. Now on arimidex  She has osteoporosis and on Prolia     HISTORY OF PRESENT ILLNESS:    Oncologic History:  Ms Bernardo Mckeon is a 44-year-old female with no significant past medical history except hyperlipidemia was seen during initial consultation visit for newly diagnosed right sided ductal carcinoma in situ. Patient had routine screening mammogram which showed calcifications in 2014. This was being closely followed until recently in June the mammogram showed increase in number of calcifications. Pt had Biopsy on 7/1/16 which showed DCIS. Her Mother diagnosed at 78 with breast cancer. She never smoked. NO ETOH abuse. 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 She denied any pain, nipple discharge, nipple and sking changes. INTERVAL HISTORY:  Patient is returning for follow-up visit and to discuss mammogram results and further recommendations. She denies any new mass or lump in her breast.  Denies any chest pain or shortness of breath. Denies any fever or chills. No new bone pain or back pain. During this visit patient's allergy, social, medical, surgical history and medications were reviewed and updated.          Allergies   Allergen Reactions    Sulfamethoxazole-Trimethoprim Rash         Current Facility-Administered Medications          Current

## 2023-09-20 NOTE — TELEPHONE ENCOUNTER
Pedro Pablo Ernst MD VISIT  DR Rebecca Bashir IN TO SEE PATIENT  ORDERS RECEIVED  DEXA SCAN NO  RV 1 YEAR WITH MAMMOGRAM PRIOR  DEXA SCAN SCHEDULED WITH Kinsey Mcdonnell FOR 09/23/23 @9AM ARRIVE BY 8:45AM @ [address]  MAMMOGRAM SCHEDULED FOR 09/23/24 @8AM ARRIVE BY 7:45AM @ [address]  MD VISIT 09/25/24 @8AM  AVS PRINTED AND GIVEN TO PATIENT WITH INSTRUCTIONS  PATIENT DISCHARGED AMBULATORY

## 2023-09-23 ENCOUNTER — HOSPITAL ENCOUNTER (OUTPATIENT)
Dept: MAMMOGRAPHY | Age: 68
End: 2023-09-23
Attending: INTERNAL MEDICINE
Payer: COMMERCIAL

## 2023-09-23 DIAGNOSIS — Z79.811 AROMATASE INHIBITOR USE: ICD-10-CM

## 2023-09-23 PROCEDURE — 77080 DXA BONE DENSITY AXIAL: CPT

## 2023-10-26 ENCOUNTER — TELEPHONE (OUTPATIENT)
Dept: INFUSION THERAPY | Facility: MEDICAL CENTER | Age: 68
End: 2023-10-26

## 2023-10-26 DIAGNOSIS — D05.11 DUCTAL CARCINOMA IN SITU (DCIS) OF RIGHT BREAST: Primary | ICD-10-CM

## 2023-10-26 NOTE — TELEPHONE ENCOUNTER
Message left for patient that an order has been placed for MRI bilateral breasts with and without contrast.

## 2023-11-21 ENCOUNTER — HOSPITAL ENCOUNTER (OUTPATIENT)
Dept: MRI IMAGING | Age: 68
Discharge: HOME | End: 2023-11-23
Attending: INTERNAL MEDICINE
Payer: COMMERCIAL

## 2023-11-21 DIAGNOSIS — D05.11 DUCTAL CARCINOMA IN SITU (DCIS) OF RIGHT BREAST: ICD-10-CM

## 2023-11-21 LAB
CREAT SERPL-MCNC: 0.6 MG/DL (ref 0.5–0.9)
GFR SERPL CREATININE-BSD FRML MDRD: >60 ML/MIN/1.73M2

## 2023-11-21 PROCEDURE — 36415 COLL VENOUS BLD VENIPUNCTURE: CPT

## 2023-11-21 PROCEDURE — A9579 GAD-BASE MR CONTRAST NOS,1ML: HCPCS | Performed by: INTERNAL MEDICINE

## 2023-11-21 PROCEDURE — C8908 MRI W/O FOL W/CONT, BREAST,: HCPCS

## 2023-11-21 PROCEDURE — 6360000004 HC RX CONTRAST MEDICATION: Performed by: INTERNAL MEDICINE

## 2023-11-21 PROCEDURE — 82565 ASSAY OF CREATININE: CPT

## 2023-11-21 RX ADMIN — GADOTERIDOL 15 ML: 279.3 INJECTION, SOLUTION INTRAVENOUS at 10:42

## 2024-02-28 DIAGNOSIS — M25.512 LEFT SHOULDER PAIN, UNSPECIFIED CHRONICITY: Primary | ICD-10-CM

## 2024-02-29 ENCOUNTER — OFFICE VISIT (OUTPATIENT)
Dept: ORTHOPEDIC SURGERY | Age: 69
End: 2024-02-29
Payer: COMMERCIAL

## 2024-02-29 VITALS — WEIGHT: 145 LBS | BODY MASS INDEX: 28.47 KG/M2 | OXYGEN SATURATION: 99 % | HEIGHT: 60 IN | RESPIRATION RATE: 17 BRPM

## 2024-02-29 DIAGNOSIS — M48.02 CERVICAL STENOSIS OF SPINE: ICD-10-CM

## 2024-02-29 DIAGNOSIS — M54.12 CERVICAL RADICULOPATHY: ICD-10-CM

## 2024-02-29 PROCEDURE — 1123F ACP DISCUSS/DSCN MKR DOCD: CPT | Performed by: PHYSICIAN ASSISTANT

## 2024-02-29 PROCEDURE — 99204 OFFICE O/P NEW MOD 45 MIN: CPT | Performed by: PHYSICIAN ASSISTANT

## 2024-02-29 RX ORDER — TIZANIDINE 4 MG/1
4 TABLET ORAL 3 TIMES DAILY
Qty: 30 TABLET | Refills: 0 | Status: SHIPPED | OUTPATIENT
Start: 2024-02-29 | End: 2024-03-10

## 2024-02-29 RX ORDER — METHYLPREDNISOLONE 4 MG/1
TABLET ORAL
Qty: 1 KIT | Refills: 0 | Status: SHIPPED | OUTPATIENT
Start: 2024-02-29

## 2024-02-29 ASSESSMENT — ENCOUNTER SYMPTOMS
NAUSEA: 0
COLOR CHANGE: 0
CHEST TIGHTNESS: 0
DIARRHEA: 0
VOMITING: 0
ABDOMINAL PAIN: 0
COUGH: 0
RESPIRATORY NEGATIVE: 1
GASTROINTESTINAL NEGATIVE: 1
SHORTNESS OF BREATH: 0
APNEA: 0
CONSTIPATION: 0
ABDOMINAL DISTENTION: 0

## 2024-02-29 NOTE — PROGRESS NOTES
[facility] ORTHOPEDICS AND SPORTS MEDICINE  [address]  [place] [address]  Dept: [phone]  Dept Fax: [phone]        Left Shoulder - New patient last seen in office on 1/06/2020    Chief Complaint:     Chief Complaint   Patient presents with    Shoulder Pain     Left shoulder pain- new patient     HPI:     Shoulder Exam  Sandra Brady is a 68 y.o. year old right hand dominant female that has had pain in the left shoulder for 4 days. As far as any trauma to the shoulder, the patient indicates she does not recall any injury. She recently went on vacation to [place] and when she woke up this past Sunday she noticed some arm and neck discomfort. The pain is not worse at night and when doing overhead activities. Weakness of the shoulder has not been noted. The pain is described as a sharp, shooting like pain. The patient has noted some swelling of her left shoulder. The pain restricts activities such as lifting however ROM remains intact. The pain does not seem to improve with time. She has tried taking Motrin 400 mg BID as well as massages which have helped somewhat but the pain remains constant. The patient has had a cortisone subacromial injection. The patient has not tried physical therapy. The patient has not has surgery. The opposite shoulder is okay. Neck pain has been present.     Review of Systems   Constitutional:  Positive for activity change. Negative for appetite change, fatigue and fever.   Respiratory: Negative.  Negative for apnea, cough, chest tightness and shortness of breath.    Cardiovascular: Negative.  Negative for chest pain, palpitations and leg swelling.   Gastrointestinal: Negative.  Negative for abdominal distention, abdominal pain, constipation, diarrhea, nausea and vomiting.   Genitourinary: Negative.  Negative for difficulty urinating, dysuria and hematuria.   Musculoskeletal:  Positive for

## 2024-03-11 ENCOUNTER — HOSPITAL ENCOUNTER (OUTPATIENT)
Dept: PHYSICAL THERAPY | Facility: CLINIC | Age: 69
Setting detail: THERAPIES SERIES
Discharge: HOME | End: 2024-03-11
Payer: COMMERCIAL

## 2024-03-11 PROCEDURE — 97161 PT EVAL LOW COMPLEX 20 MIN: CPT

## 2024-03-11 PROCEDURE — 97110 THERAPEUTIC EXERCISES: CPT

## 2024-03-11 NOTE — CONSULTS
[] [facility]  Outpatient Rehabilitation &  Therapy  [address]  [phone]  F: [phone] [x] [facility]  Outpatient Rehabilitation &  Therapy  [address]   [address]  P: [phone]  F: [phone] [] [facility]  Outpatient Rehabilitation &  Therapy  [address]  P: [phone]  F: [phone] [] [facility]  Outpatient Rehabilitation &  Therapy  [address]  P: [phone]  F: [phone] [] [facility]  Outpatient Rehabilitation &  Therapy  [address]   [address]   P: [phone]  F: [phone]      Physical Therapy Spine Evaluation    Date:  3/11/2024  Patient: Sandra Brady  : 1955  MRN: [id number]  Physician: Silvia Qucah PA-C     Insurance: [employer] PPO (Medical necessity)  Medical Diagnosis: M48.02 (ICD-10-CM) - Cervical stenosis of spine, M54.12 (ICD-10-CM) - Cervical radiculopathy   Rehab Codes: M48.02;M54.12;M54.2;M79.622;R29.3  Onset Date: 24  Next 's appt.: 4/15/24    Subjective:   CC: Sandra Brady is a 69 y/o female that presents with cervical pain extending down the L UE.  HPI: Pt reports that 2 weeks ago she woke up and could not turn her head to the R. She also has pain extending from the shoulder to the elbow. She took Zanaflex and a medrol dose pack which helped with the pain. She rates the pain as a 5/10 right now.     Past Medical History:   Diagnosis Date    Allergic rhinitis     Bilateral carpal tunnel syndrome 2018    hx, corrected by surgery, bilateral    Breast cancer, right (HCC) 2016    Ductal carcinoma in situ (DCIS) of right breast     Hyperlipidemia     currently off meds, per pt's choice    Psoriasis     Thickened endometrium 2016    while on Tamoxifen    Wears glasses      Past Surgical History:   Procedure Laterality Date    APPENDECTOMY      BREAST LUMPECTOMY Right 2016    BREAST LUMPECTOMY Right 2016

## 2024-03-18 ENCOUNTER — HOSPITAL ENCOUNTER (OUTPATIENT)
Dept: PHYSICAL THERAPY | Facility: CLINIC | Age: 69
Setting detail: THERAPIES SERIES
Discharge: HOME | End: 2024-03-18
Payer: COMMERCIAL

## 2024-03-18 PROCEDURE — 97110 THERAPEUTIC EXERCISES: CPT

## 2024-03-18 NOTE — FLOWSHEET NOTE
hold  - Standing Shoulder Row with Anchored Resistance  - 1 x daily - 3 x weekly - 2-3 sets - 10 reps  - Shoulder extension with resistance - Neutral  - 1 x daily - 3 x weekly - 2-3 sets - 10 reps       Method of Education: [x] Verbal  [x] Demo  [] Written  Comprehension of Education:  [x] Verbalizes understanding.  [x] Demonstrates understanding.  [x] Needs review.  [x] Demonstrates/verbalizes HEP/Ed previously given. Pt given yellow theraband.     Plan: [x] Continue current frequency toward long and short term goals.    [x] Specific Instructions for subsequent treatments: Increase reps as able to increase strength.       Time In:0902            Time Out: 0947    Electronically signed by:  Yolande Cope PTA

## 2024-03-25 ENCOUNTER — HOSPITAL ENCOUNTER (OUTPATIENT)
Dept: PHYSICAL THERAPY | Facility: CLINIC | Age: 69
Setting detail: THERAPIES SERIES
Discharge: HOME | End: 2024-03-25
Payer: COMMERCIAL

## 2024-03-25 PROCEDURE — 97110 THERAPEUTIC EXERCISES: CPT

## 2024-03-25 NOTE — FLOWSHEET NOTE
[x] [facility]  Outpatient Rehabilitation &  Therapy  [address]  P: [phone]  F: [phone]     Physical Therapy Daily Treatment Note    Date:  3/25/2024  Patient Name:  Sandra Brady    :  1955  MRN: [id number]  Physician:  Silvia Quach PA-C                      Insurance: [facility] PPO (Medical necessity)  Medical Diagnosis: M48.02 (ICD-10-CM) - Cervical stenosis of spine, M54.12 (ICD-10-CM) - Cervical radiculopathy   Rehab Codes: M48.02;M54.12;M54.2;M79.622;R29.3  Onset Date: 24               Next 's appt.: 4/15/24  Visit# / total visits: 3/6;      Cancels/No Shows: 0/0    Subjective:    Pain:  [] Yes  [x] No Location:  N/A Pain Rating: (0-10 scale) 0/10  Pain altered Tx:  [x] No  [] Yes  Action:  Comments: Arrives noting compliance to HEP and without pain nowadays. Some tightness/stiffness in neck on occasion. Compliant with HEP. Reports she must leave by 3:45 pm for another appt. Tingling in hands but notes this is always present even after bilateral carpel tunnel surgery.     Objective:  Modalities:   Precautions:  Exercises:  Exercise Reps/ Time Weight/ Level Comments Completed    UBE add             Upper  trap stretch 2 x 30\"  More tight on the L x   Levator Scapulae Stretch 2 x 30\"    x   Upper back stretch 3 x 30\"      Chin tucks 10 x 3\"   x   Shoulder rolls, forward/retro 10x ea   x   shrugs 10x   x   C-rotation 5x5\"  In standing  x   Scap retraction 10 x 3\"      Doorway stretch 2 x 30\"   x          Standing    New 3/25    rows 15x Lime  x   Ext  15x lime  x   B ER 10x lime  x   HAB 10x orange  x   Tricep ext 10x2 lime  x   Shoulder depression 10x lime  x   3 way shoulder 10xea A Flex, scap, abd x   Ball on wall  10xea  B-cw/ccw x                                             Other: Education given for HEP and technique and form.       Treatment Charges: Mins Units   []  Modalities     [x]  Ther Exercise 33 2   []  Manual Therapy     []  Ther

## 2024-04-01 ENCOUNTER — HOSPITAL ENCOUNTER (OUTPATIENT)
Dept: PHYSICAL THERAPY | Facility: CLINIC | Age: 69
Setting detail: THERAPIES SERIES
Discharge: HOME | End: 2024-04-01
Payer: COMMERCIAL

## 2024-04-01 PROCEDURE — 97110 THERAPEUTIC EXERCISES: CPT

## 2024-04-01 NOTE — FLOWSHEET NOTE
[x] [facility]  Outpatient Rehabilitation &  Therapy  [address]  P: [phone]  F: [phone]     Physical Therapy Daily Treatment Note    Date:  2024  Patient Name:  Sandra Brady    :  1955  MRN: [id number]  Physician:  Silvia Quach PA-C                      Insurance: [facility] PPO (Medical necessity)  Medical Diagnosis: M48.02 (ICD-10-CM) - Cervical stenosis of spine, M54.12 (ICD-10-CM) - Cervical radiculopathy   Rehab Codes: M48.02;M54.12;M54.2;M79.622;R29.3  Onset Date: 24               Next 's appt.: 4/15/24  Visit# / total visits: ;      Cancels/No Shows: 0/0    Subjective:    Pain:  [] Yes  [x] No Location:  N/A Pain Rating: (0-10 scale) 0/10  Pain altered Tx:  [x] No  [] Yes  Action:  Comments: Arrives without pain in the neck or into the shoulder. Notes improvements in ROM and has been compliant with HEP. Request to leave early d/t prior commitment.     Objective:  Modalities:   Precautions:  Exercises:  Exercise Reps/ Time Weight/ Level Comments Completed    UBE add             Upper  trap stretch 2 x 30\"  More tight on the L x   Levator Scapulae Stretch 2 x 30\"    x   Upper back stretch 3 x 30\"      Chin tucks 10 x 3\"   x   Shoulder rolls, forward/retro 10x ea   x   shrugs 10x   x   C-rotation 5x5\"  In standing  x   Chin tuck + Rotation 5xea      Scap retraction 10 x 3\"      Doorway stretch 2 x 30\"   x          Standing    New 3/25    rows 15x Lime  x   Ext  15x lime  x   B ER + Chin tuck 10x lime  Progressed  x   HAB + Chin tuck 10x orange  Progressed  x   Tricep ext 10x2 lime  x   Shoulder depression 10x lime  x   3 way shoulder 10xea 1[employer]  - Flex, scap, abd x   Ball on wall  10xea  B-cw/ccw x   Flexion + Ossicilations 5x Lime New     Scap clocks 5 laps Bilaterally organ New                          Other: Education given for HEP and technique and form.       Treatment Charges: Mins Units   []  Modalities     [x]  Ther

## 2024-04-08 ENCOUNTER — HOSPITAL ENCOUNTER (OUTPATIENT)
Dept: PHYSICAL THERAPY | Facility: CLINIC | Age: 69
Setting detail: THERAPIES SERIES
Discharge: HOME | End: 2024-04-08
Payer: COMMERCIAL

## 2024-04-08 NOTE — FLOWSHEET NOTE
[] [facility]  Outpatient Rehabilitation &  Therapy  [address]  [phone]  [phone] [x] [facility]  Outpatient Rehabilitation &  Therapy  [address]  P: [phone]  F: [phone] [] [facility]  Outpatient Rehabilitation &  Therapy  [address]  P: [phone]  F: [phone] [] [facility]  Outpatient Rehabilitation &  Therapy  [address]  [phone]  [phone] [] [facility]  Outpatient Rehabilitation &  Therapy  [address]   P: [phone]  F: [phone]  [] [facility]  Outpatient Rehabilitation &  Therapy  [address]  P: [phone]  F: [phone] [] [facility]  Outpatient Rehabilitation &  Therapy  [address]  P: [phone]  F: [phone] [] [facility]  Outpatient Rehabilitation &  Therapy  [address]  P: [phone]  F: [phone] [] [facility]  Outpatient Rehabilitation &  Therapy  [address]  P: [phone]  F: [phone]  [] [facility] Outpatient Rehabilitation &  Therapy  [address]  P: [phone]  F: [phone]     Therapy Cancel/No Show note    Date: 2024  Patient: Sandra Brady  : 1955  MRN: [id number]    Cancels/No Shows to date: 10    For today's appointment patient:    [x]  Cancelled    [] Rescheduled appointment    [] No-show     Reason given by patient:    []  Patient ill    []  Conflicting appointment    [] No transportation      [] Conflict with work    [] No reason given    [] Weather related    [] COVID-19    [x] Other:      Comments: Cx d/t Eclipse       [x] Next appointment was confirmed    Electronically signed by: Chago Ramachandran PT

## 2024-04-15 ENCOUNTER — HOSPITAL ENCOUNTER (OUTPATIENT)
Dept: PHYSICAL THERAPY | Facility: CLINIC | Age: 69
Setting detail: THERAPIES SERIES
Discharge: HOME | End: 2024-04-15
Payer: COMMERCIAL

## 2024-04-15 PROCEDURE — 97110 THERAPEUTIC EXERCISES: CPT

## 2024-04-15 NOTE — DISCHARGE SUMMARY
respectively, as well as rotation to 60 degrees bilaterally to reduce difficulty with ADLs [x]  []  []      3. ? Strength: Increase L shoulder strength to 5/5 throughout to ease functional limitations and mobility  [x]  []  []      4. Independent with Home Exercise Programs with ability to demonstrate exercises without cueing for technique.  [x]  []  []      5. Improve score on NDI from 32% impairment to less than 5% impairment to demonstrate improved functional mobility [x]  []  []           Pt. Education:  [x] Yes  [] No  [x] Reviewed Prior HEP/Ed  Access Code: [email or web address]  URL: [email or web address]  Date: 03/18/2024  Prepared by: Yolande Cope    Exercises  - Seated Levator Scapulae Stretch  - 1 x daily - 7 x weekly - 3 sets - 30\" hold  - Seated Scapular Retraction  - 1 x daily - 7 x weekly - 10 reps - 5\" hold  - Seated Cervical Retraction  - 1 x daily - 7 x weekly - 10 reps - 3\" hold  - Seated Cervical Rotation AROM  - 1 x daily - 7 x weekly - 10 reps  - Standing Shoulder Row with Anchored Resistance  - 1 x daily - 7 x weekly - 10 reps    Access Code: [email or web address]  URL: [email or web address]  Date: 03/11/2024  Prepared by: Chago Ramachandran     Exercises  - Gentle Levator Scapulae Stretch  - 1 x daily - 3 x weekly - 3 reps - 20 second hold  - Seated Levator Scapulae Stretch  - 1 x daily - 3 x weekly - 3 reps - 20 second hold  - Supine Chin Tuck  - 1 x daily - 3 x weekly - 2-3 sets - 10 reps - 3 seoncd hold  - Standing Shoulder Row with Anchored Resistance  - 1 x daily - 3 x weekly - 2-3 sets - 10 reps  - Shoulder extension with resistance - Neutral  - 1 x daily - 3 x weekly - 2-3 sets - 10 reps       Date: 04/15/2024  Prepared by: Chago Ramachandran    Exercises  - Gentle Levator Scapulae Stretch  - 1 x daily - 3 x weekly - 3 reps - 20 second hold  - Seated Levator Scapulae Stretch  - 1 x daily - 3 x weekly - 3 reps - 20 second hold  - Supine Chin Tuck  - 1 x daily - 3 x weekly - 2-3 sets - 10 reps - 3 seoncd

## 2024-04-17 ENCOUNTER — OFFICE VISIT (OUTPATIENT)
Dept: ORTHOPEDIC SURGERY | Age: 69
End: 2024-04-17
Payer: COMMERCIAL

## 2024-04-17 VITALS — RESPIRATION RATE: 16 BRPM | WEIGHT: 140 LBS | BODY MASS INDEX: 27.48 KG/M2 | OXYGEN SATURATION: 98 % | HEIGHT: 60 IN

## 2024-04-17 DIAGNOSIS — M48.02 CERVICAL STENOSIS OF SPINE: Primary | ICD-10-CM

## 2024-04-17 DIAGNOSIS — M54.12 CERVICAL RADICULOPATHY: ICD-10-CM

## 2024-04-17 PROCEDURE — 1123F ACP DISCUSS/DSCN MKR DOCD: CPT | Performed by: PHYSICIAN ASSISTANT

## 2024-04-17 PROCEDURE — 99213 OFFICE O/P EST LOW 20 MIN: CPT | Performed by: PHYSICIAN ASSISTANT

## 2024-04-17 RX ORDER — UBIDECARENONE 100 MG
CAPSULE ORAL
COMMUNITY
Start: 2019-11-20

## 2024-04-17 RX ORDER — ATORVASTATIN CALCIUM 20 MG/1
20 TABLET, FILM COATED ORAL DAILY
COMMUNITY

## 2024-04-17 ASSESSMENT — ENCOUNTER SYMPTOMS
DIARRHEA: 0
NAUSEA: 0
CONSTIPATION: 0
SHORTNESS OF BREATH: 0
ABDOMINAL PAIN: 0
RESPIRATORY NEGATIVE: 1
GASTROINTESTINAL NEGATIVE: 1
VOMITING: 0
APNEA: 0
COUGH: 0
CHEST TIGHTNESS: 0
COLOR CHANGE: 0
ABDOMINAL DISTENTION: 0

## 2024-04-17 NOTE — PROGRESS NOTES
[facility]  [address]  [place] [place] [address]  Dept: [phone]  Dept Fax: [phone]        Ambulatory Follow Up      Subjective:   Sandra Brady is a 68 y.o. year old female who presents to our office today for routine followup regarding her   1. Cervical stenosis of spine    2. Cervical radiculopathy    .    Chief Complaint   Patient presents with    Shoulder Pain     Left Shoulder Pain-Surgical Discussion       HPI Sandra Brady  is a 68 y.o. Right hand dominant  female who presents today in follow for cervical radiculopathy.  The patient was last seen on 2/29/2024 and underwent treatment in the form of Medrol Dosepak, Zanaflex and a prescription for physical therapy.  The patient notes 100% improvement with the previous treatment.  Patient states she is doing really well.  She wanted to discuss cervical spine surgery options.  The patient is a retired neuro RN.  Patient continues to do her exercises that she learned at physical therapy.  The patient states the Medrol Dosepak worked wonders.  She did take 3 of the muscle relaxers and then she was feeling much better after 2 days on the muscle relaxers and a Medrol Dosepak and then she began physical therapy which helped the rest.    Review of Systems   Constitutional:  Positive for activity change. Negative for appetite change, fatigue and fever.   Respiratory: Negative.  Negative for apnea, cough, chest tightness and shortness of breath.    Cardiovascular: Negative.  Negative for chest pain, palpitations and leg swelling.   Gastrointestinal: Negative.  Negative for abdominal distention, abdominal pain, constipation, diarrhea, nausea and vomiting.   Genitourinary: Negative.  Negative for difficulty urinating, dysuria and hematuria.   Musculoskeletal: Negative.  Negative for arthralgias, gait problem, joint swelling and myalgias.   Skin: Negative.

## 2024-06-11 DIAGNOSIS — M25.521 RIGHT ELBOW PAIN: Primary | ICD-10-CM

## 2024-09-23 ENCOUNTER — HOSPITAL ENCOUNTER (OUTPATIENT)
Dept: MAMMOGRAPHY | Age: 69
Discharge: HOME | End: 2024-09-25
Attending: INTERNAL MEDICINE
Payer: COMMERCIAL

## 2024-09-23 VITALS — HEIGHT: 59 IN | BODY MASS INDEX: 27.42 KG/M2 | WEIGHT: 136 LBS

## 2024-09-23 DIAGNOSIS — Z12.31 SCREENING MAMMOGRAM, ENCOUNTER FOR: ICD-10-CM

## 2024-09-23 PROCEDURE — 77063 BREAST TOMOSYNTHESIS BI: CPT

## 2024-09-25 ENCOUNTER — TELEPHONE (OUTPATIENT)
Dept: ONCOLOGY | Age: 69
End: 2024-09-25

## 2024-09-25 ENCOUNTER — OFFICE VISIT (OUTPATIENT)
Dept: ONCOLOGY | Age: 69
End: 2024-09-25
Payer: COMMERCIAL

## 2024-09-25 VITALS
TEMPERATURE: 97.5 F | DIASTOLIC BLOOD PRESSURE: 79 MMHG | WEIGHT: 138 LBS | BODY MASS INDEX: 27.87 KG/M2 | RESPIRATION RATE: 16 BRPM | SYSTOLIC BLOOD PRESSURE: 121 MMHG | HEART RATE: 64 BPM

## 2024-09-25 DIAGNOSIS — Z12.31 SCREENING MAMMOGRAM, ENCOUNTER FOR: Primary | ICD-10-CM

## 2024-09-25 PROCEDURE — 99211 OFF/OP EST MAY X REQ PHY/QHP: CPT

## 2024-09-25 PROCEDURE — 99214 OFFICE O/P EST MOD 30 MIN: CPT | Performed by: INTERNAL MEDICINE

## 2024-09-25 PROCEDURE — 1123F ACP DISCUSS/DSCN MKR DOCD: CPT | Performed by: INTERNAL MEDICINE

## 2024-09-27 DIAGNOSIS — M25.521 RIGHT ELBOW PAIN: Primary | ICD-10-CM

## 2024-09-30 ENCOUNTER — OFFICE VISIT (OUTPATIENT)
Dept: ORTHOPEDIC SURGERY | Age: 69
End: 2024-09-30
Payer: COMMERCIAL

## 2024-09-30 VITALS — OXYGEN SATURATION: 98 % | WEIGHT: 137 LBS | RESPIRATION RATE: 16 BRPM | HEIGHT: 59 IN | BODY MASS INDEX: 27.62 KG/M2

## 2024-09-30 DIAGNOSIS — M77.11 RIGHT TENNIS ELBOW: Primary | ICD-10-CM

## 2024-09-30 PROCEDURE — 99214 OFFICE O/P EST MOD 30 MIN: CPT | Performed by: PHYSICIAN ASSISTANT

## 2024-09-30 PROCEDURE — 20551 NJX 1 TENDON ORIGIN/INSJ: CPT | Performed by: PHYSICIAN ASSISTANT

## 2024-09-30 PROCEDURE — 1123F ACP DISCUSS/DSCN MKR DOCD: CPT | Performed by: PHYSICIAN ASSISTANT

## 2024-09-30 RX ORDER — LIDOCAINE HYDROCHLORIDE 10 MG/ML
0.5 INJECTION, SOLUTION INFILTRATION; PERINEURAL ONCE
Status: COMPLETED | OUTPATIENT
Start: 2024-09-30 | End: 2024-09-30

## 2024-09-30 RX ORDER — BUPIVACAINE HYDROCHLORIDE 2.5 MG/ML
0.5 INJECTION, SOLUTION INFILTRATION; PERINEURAL ONCE
Status: COMPLETED | OUTPATIENT
Start: 2024-09-30 | End: 2024-09-30

## 2024-09-30 RX ORDER — BETAMETHASONE SODIUM PHOSPHATE AND BETAMETHASONE ACETATE 3; 3 MG/ML; MG/ML
6 INJECTION, SUSPENSION INTRA-ARTICULAR; INTRALESIONAL; INTRAMUSCULAR; SOFT TISSUE ONCE
Status: COMPLETED | OUTPATIENT
Start: 2024-09-30 | End: 2024-09-30

## 2024-09-30 RX ADMIN — LIDOCAINE HYDROCHLORIDE 0.5 ML: 10 INJECTION, SOLUTION INFILTRATION; PERINEURAL at 15:30

## 2024-09-30 RX ADMIN — BETAMETHASONE SODIUM PHOSPHATE AND BETAMETHASONE ACETATE 6 MG: 3; 3 INJECTION, SUSPENSION INTRA-ARTICULAR; INTRALESIONAL; INTRAMUSCULAR; SOFT TISSUE at 15:31

## 2024-09-30 RX ADMIN — BUPIVACAINE HYDROCHLORIDE 1.25 MG: 2.5 INJECTION, SOLUTION INFILTRATION; PERINEURAL at 15:31

## 2024-09-30 ASSESSMENT — ENCOUNTER SYMPTOMS
NAUSEA: 0
RESPIRATORY NEGATIVE: 1
DIARRHEA: 0
COUGH: 0
ABDOMINAL PAIN: 0
ABDOMINAL DISTENTION: 0
APNEA: 0
COLOR CHANGE: 0
SHORTNESS OF BREATH: 0
VOMITING: 0
CONSTIPATION: 0
CHEST TIGHTNESS: 0
GASTROINTESTINAL NEGATIVE: 1

## 2024-09-30 NOTE — PROGRESS NOTES
extrapolated by contextual diversion   Electronically signed by Silvia Quach PA-C, on 9/30/2024 at 9:04 AM

## 2024-09-30 NOTE — PATIENT INSTRUCTIONS
PATIENTIQ:  PatientIQ helps [facility] stay in touch with you to know how you're feeling, and provides education and care instructions to you at various time points.   Your answers help your care team track your progress to provide the best care possible. PatientIQ will contact you pre-op and post-op via email or text with:  Educational Videos and Care Instructions  Questionnaires About How You're Feeling    Your participation provides you valuable education and helps [facility] continue to provide quality care to all patients. Thank you    CORTISONE INJECTION CARE    The injection site should never get red, hot, or swollen and if it does the patient will contact our office right away. The patient may experience a increase in soreness the first 24-48 hours due to a cortisone flair and can take anti-inflammatories for a short period of time to reduce that soreness. The patient should not submerge the injection site in water for a minimum of 24 hours to avoid infection. This means no lakes, pools, ponds, or hot tubs for 24 hours. If the patient is diabetic the injection may increase their blood sugar for up to one week. The patient can do this cortisone injection once every 4 months as needed.